# Patient Record
Sex: FEMALE | Race: WHITE | NOT HISPANIC OR LATINO | URBAN - METROPOLITAN AREA
[De-identification: names, ages, dates, MRNs, and addresses within clinical notes are randomized per-mention and may not be internally consistent; named-entity substitution may affect disease eponyms.]

---

## 2018-03-01 ENCOUNTER — OFFICE VISIT - HEALTHEAST (OUTPATIENT)
Dept: FAMILY MEDICINE | Facility: CLINIC | Age: 23
End: 2018-03-01

## 2018-03-01 DIAGNOSIS — F50.819 BINGE EATING DISORDER: ICD-10-CM

## 2018-03-01 DIAGNOSIS — Z30.41 SURVEILLANCE FOR BIRTH CONTROL, ORAL CONTRACEPTIVES: ICD-10-CM

## 2018-03-01 DIAGNOSIS — F33.0 MILD EPISODE OF RECURRENT MAJOR DEPRESSIVE DISORDER (H): ICD-10-CM

## 2018-03-01 ASSESSMENT — MIFFLIN-ST. JEOR: SCORE: 1771.03

## 2018-03-01 NOTE — ASSESSMENT & PLAN NOTE
Continue Prozac to 50 mg daily.  Will also start back into counseling as per eating disorder recommendations.

## 2018-03-01 NOTE — ASSESSMENT & PLAN NOTE
Overall doing well, but admits that she could use further counseling.  Referral placed.  Will restart back in 2 and continue the Prozac as previous at 50 mg daily.

## 2018-03-01 NOTE — ASSESSMENT & PLAN NOTE
Mainly using for control of periods.  Also discussed the fact that with her weight, it does reduce the effectiveness of the oral birth control for contraception and therefore if becoming sexuallyactive recommend using barrier contraception to help with contraception as well as prevention of STDs.

## 2018-03-21 ENCOUNTER — COMMUNICATION - HEALTHEAST (OUTPATIENT)
Dept: FAMILY MEDICINE | Facility: CLINIC | Age: 23
End: 2018-03-21

## 2018-03-29 ENCOUNTER — COMMUNICATION - HEALTHEAST (OUTPATIENT)
Dept: FAMILY MEDICINE | Facility: CLINIC | Age: 23
End: 2018-03-29

## 2018-04-13 ENCOUNTER — OFFICE VISIT - HEALTHEAST (OUTPATIENT)
Dept: FAMILY MEDICINE | Facility: CLINIC | Age: 23
End: 2018-04-13

## 2018-04-13 DIAGNOSIS — K12.0 ORAL APHTHOUS ULCER: ICD-10-CM

## 2018-04-13 DIAGNOSIS — J02.9 ACUTE PHARYNGITIS: ICD-10-CM

## 2018-04-13 LAB — DEPRECATED S PYO AG THROAT QL EIA: NORMAL

## 2018-04-14 LAB — GROUP A STREP BY PCR: NORMAL

## 2018-05-23 ENCOUNTER — RECORDS - HEALTHEAST (OUTPATIENT)
Dept: ADMINISTRATIVE | Facility: OTHER | Age: 23
End: 2018-05-23

## 2018-06-01 ENCOUNTER — OFFICE VISIT - HEALTHEAST (OUTPATIENT)
Dept: FAMILY MEDICINE | Facility: CLINIC | Age: 23
End: 2018-06-01

## 2018-06-01 DIAGNOSIS — T30.4 CHEMICAL BURN: ICD-10-CM

## 2018-06-11 ENCOUNTER — OFFICE VISIT - HEALTHEAST (OUTPATIENT)
Dept: FAMILY MEDICINE | Facility: CLINIC | Age: 23
End: 2018-06-11

## 2018-06-11 DIAGNOSIS — J01.10 ACUTE NON-RECURRENT FRONTAL SINUSITIS: ICD-10-CM

## 2018-07-23 ENCOUNTER — COMMUNICATION - HEALTHEAST (OUTPATIENT)
Dept: FAMILY MEDICINE | Facility: CLINIC | Age: 23
End: 2018-07-23

## 2018-08-14 ENCOUNTER — RECORDS - HEALTHEAST (OUTPATIENT)
Dept: ADMINISTRATIVE | Facility: OTHER | Age: 23
End: 2018-08-14

## 2018-08-15 ENCOUNTER — OFFICE VISIT - HEALTHEAST (OUTPATIENT)
Dept: FAMILY MEDICINE | Facility: CLINIC | Age: 23
End: 2018-08-15

## 2018-08-15 DIAGNOSIS — R39.89 POSSIBLE URINARY TRACT INFECTION: ICD-10-CM

## 2018-08-15 LAB
ALBUMIN UR-MCNC: NEGATIVE MG/DL
APPEARANCE UR: ABNORMAL
BACTERIA #/AREA URNS HPF: ABNORMAL HPF
BILIRUB UR QL STRIP: NEGATIVE
COLOR UR AUTO: YELLOW
GLUCOSE UR STRIP-MCNC: NEGATIVE MG/DL
HGB UR QL STRIP: ABNORMAL
KETONES UR STRIP-MCNC: NEGATIVE MG/DL
LEUKOCYTE ESTERASE UR QL STRIP: ABNORMAL
NITRATE UR QL: POSITIVE
PH UR STRIP: 5.5 [PH] (ref 5–8)
RBC #/AREA URNS AUTO: ABNORMAL HPF
SP GR UR STRIP: >=1.03 (ref 1–1.03)
SQUAMOUS #/AREA URNS AUTO: ABNORMAL LPF
UROBILINOGEN UR STRIP-ACNC: ABNORMAL
WBC #/AREA URNS AUTO: ABNORMAL HPF

## 2018-08-17 ENCOUNTER — COMMUNICATION - HEALTHEAST (OUTPATIENT)
Dept: FAMILY MEDICINE | Facility: CLINIC | Age: 23
End: 2018-08-17

## 2018-08-17 LAB — BACTERIA SPEC CULT: ABNORMAL

## 2018-08-23 ENCOUNTER — COMMUNICATION - HEALTHEAST (OUTPATIENT)
Dept: FAMILY MEDICINE | Facility: CLINIC | Age: 23
End: 2018-08-23

## 2018-08-27 ENCOUNTER — RECORDS - HEALTHEAST (OUTPATIENT)
Dept: ADMINISTRATIVE | Facility: OTHER | Age: 23
End: 2018-08-27

## 2018-09-17 ENCOUNTER — RECORDS - HEALTHEAST (OUTPATIENT)
Dept: GENERAL RADIOLOGY | Facility: CLINIC | Age: 23
End: 2018-09-17

## 2018-09-17 ENCOUNTER — OFFICE VISIT - HEALTHEAST (OUTPATIENT)
Dept: FAMILY MEDICINE | Facility: CLINIC | Age: 23
End: 2018-09-17

## 2018-09-17 ENCOUNTER — COMMUNICATION - HEALTHEAST (OUTPATIENT)
Dept: TELEHEALTH | Facility: CLINIC | Age: 23
End: 2018-09-17

## 2018-09-17 DIAGNOSIS — M79.644 PAIN OF FINGER OF RIGHT HAND: ICD-10-CM

## 2018-09-17 DIAGNOSIS — M79.644 PAIN IN RIGHT FINGER(S): ICD-10-CM

## 2018-09-17 ASSESSMENT — MIFFLIN-ST. JEOR: SCORE: 1799.16

## 2019-04-07 ENCOUNTER — COMMUNICATION - HEALTHEAST (OUTPATIENT)
Dept: FAMILY MEDICINE | Facility: CLINIC | Age: 24
End: 2019-04-07

## 2019-04-07 DIAGNOSIS — Z30.41 SURVEILLANCE FOR BIRTH CONTROL, ORAL CONTRACEPTIVES: ICD-10-CM

## 2019-07-24 ENCOUNTER — OFFICE VISIT - HEALTHEAST (OUTPATIENT)
Dept: FAMILY MEDICINE | Facility: CLINIC | Age: 24
End: 2019-07-24

## 2019-07-24 DIAGNOSIS — N39.0 URINARY TRACT INFECTION: ICD-10-CM

## 2019-07-24 DIAGNOSIS — H69.92 DYSFUNCTION OF LEFT EUSTACHIAN TUBE: ICD-10-CM

## 2019-07-24 DIAGNOSIS — Z13.1 SCREENING FOR DIABETES MELLITUS: ICD-10-CM

## 2019-07-24 LAB
ALBUMIN UR-MCNC: NEGATIVE MG/DL
ANION GAP SERPL CALCULATED.3IONS-SCNC: 8 MMOL/L (ref 5–18)
APPEARANCE UR: CLEAR
BACTERIA #/AREA URNS HPF: ABNORMAL HPF
BILIRUB UR QL STRIP: NEGATIVE
BUN SERPL-MCNC: 10 MG/DL (ref 8–22)
CALCIUM SERPL-MCNC: 9.3 MG/DL (ref 8.5–10.5)
CHLORIDE BLD-SCNC: 108 MMOL/L (ref 98–107)
CO2 SERPL-SCNC: 22 MMOL/L (ref 22–31)
COLOR UR AUTO: YELLOW
CREAT SERPL-MCNC: 0.6 MG/DL (ref 0.6–1.1)
GFR SERPL CREATININE-BSD FRML MDRD: >60 ML/MIN/1.73M2
GLUCOSE BLD-MCNC: 84 MG/DL (ref 70–125)
GLUCOSE UR STRIP-MCNC: NEGATIVE MG/DL
HBA1C MFR BLD: 5.2 % (ref 3.5–6)
HGB UR QL STRIP: ABNORMAL
KETONES UR STRIP-MCNC: NEGATIVE MG/DL
LEUKOCYTE ESTERASE UR QL STRIP: ABNORMAL
NITRATE UR QL: NEGATIVE
PH UR STRIP: 5 [PH] (ref 5–8)
POTASSIUM BLD-SCNC: 4.1 MMOL/L (ref 3.5–5)
RBC #/AREA URNS AUTO: ABNORMAL HPF
SODIUM SERPL-SCNC: 138 MMOL/L (ref 136–145)
SP GR UR STRIP: 1.01 (ref 1–1.03)
SQUAMOUS #/AREA URNS AUTO: ABNORMAL LPF
UROBILINOGEN UR STRIP-ACNC: ABNORMAL
WBC #/AREA URNS AUTO: ABNORMAL HPF

## 2019-07-25 LAB — BACTERIA SPEC CULT: NORMAL

## 2019-07-27 ENCOUNTER — COMMUNICATION - HEALTHEAST (OUTPATIENT)
Dept: SCHEDULING | Facility: CLINIC | Age: 24
End: 2019-07-27

## 2019-08-20 ENCOUNTER — OFFICE VISIT - HEALTHEAST (OUTPATIENT)
Dept: FAMILY MEDICINE | Facility: CLINIC | Age: 24
End: 2019-08-20

## 2019-08-20 DIAGNOSIS — F33.0 MILD EPISODE OF RECURRENT MAJOR DEPRESSIVE DISORDER (H): ICD-10-CM

## 2019-08-20 DIAGNOSIS — R05.9 COUGH: ICD-10-CM

## 2020-01-02 ENCOUNTER — OFFICE VISIT - HEALTHEAST (OUTPATIENT)
Dept: FAMILY MEDICINE | Facility: CLINIC | Age: 25
End: 2020-01-02

## 2020-01-02 DIAGNOSIS — B34.9 VIRAL SYNDROME: ICD-10-CM

## 2020-01-02 DIAGNOSIS — F50.819 BINGE EATING DISORDER: ICD-10-CM

## 2020-01-02 DIAGNOSIS — R25.1 SPELLS OF TREMBLING: ICD-10-CM

## 2020-01-02 DIAGNOSIS — F33.0 MILD EPISODE OF RECURRENT MAJOR DEPRESSIVE DISORDER (H): ICD-10-CM

## 2020-01-02 LAB
ALT SERPL W P-5'-P-CCNC: 20 U/L (ref 0–45)
AST SERPL W P-5'-P-CCNC: 12 U/L (ref 0–40)
BASOPHILS # BLD AUTO: 0.1 THOU/UL (ref 0–0.2)
BASOPHILS NFR BLD AUTO: 1 % (ref 0–2)
EOSINOPHIL # BLD AUTO: 0.4 THOU/UL (ref 0–0.4)
EOSINOPHIL NFR BLD AUTO: 3 % (ref 0–6)
ERYTHROCYTE [DISTWIDTH] IN BLOOD BY AUTOMATED COUNT: 11.2 % (ref 11–14.5)
HCT VFR BLD AUTO: 41.9 % (ref 35–47)
HGB BLD-MCNC: 13.6 G/DL (ref 12–16)
LYMPHOCYTES # BLD AUTO: 2.5 THOU/UL (ref 0.8–4.4)
LYMPHOCYTES NFR BLD AUTO: 19 % (ref 20–40)
MCH RBC QN AUTO: 27.4 PG (ref 27–34)
MCHC RBC AUTO-ENTMCNC: 32.5 G/DL (ref 32–36)
MCV RBC AUTO: 84 FL (ref 80–100)
MONOCYTES # BLD AUTO: 1 THOU/UL (ref 0–0.9)
MONOCYTES NFR BLD AUTO: 8 % (ref 2–10)
NEUTROPHILS # BLD AUTO: 9.2 THOU/UL (ref 2–7.7)
NEUTROPHILS NFR BLD AUTO: 69 % (ref 50–70)
PLATELET # BLD AUTO: 324 THOU/UL (ref 140–440)
PMV BLD AUTO: 7.9 FL (ref 7–10)
RBC # BLD AUTO: 4.97 MILL/UL (ref 3.8–5.4)
TSH SERPL DL<=0.005 MIU/L-ACNC: 0.56 UIU/ML (ref 0.3–5)
VALPROATE SERPL-MCNC: 17.5 UG/ML (ref 50–150)
WBC: 13.3 THOU/UL (ref 4–11)

## 2020-01-02 ASSESSMENT — ANXIETY QUESTIONNAIRES
5. BEING SO RESTLESS THAT IT IS HARD TO SIT STILL: NOT AT ALL
2. NOT BEING ABLE TO STOP OR CONTROL WORRYING: NOT AT ALL
IF YOU CHECKED OFF ANY PROBLEMS ON THIS QUESTIONNAIRE, HOW DIFFICULT HAVE THESE PROBLEMS MADE IT FOR YOU TO DO YOUR WORK, TAKE CARE OF THINGS AT HOME, OR GET ALONG WITH OTHER PEOPLE: SOMEWHAT DIFFICULT
4. TROUBLE RELAXING: NOT AT ALL
GAD7 TOTAL SCORE: 3
3. WORRYING TOO MUCH ABOUT DIFFERENT THINGS: NOT AT ALL
6. BECOMING EASILY ANNOYED OR IRRITABLE: NEARLY EVERY DAY
1. FEELING NERVOUS, ANXIOUS, OR ON EDGE: NOT AT ALL
7. FEELING AFRAID AS IF SOMETHING AWFUL MIGHT HAPPEN: NOT AT ALL

## 2020-01-02 ASSESSMENT — PATIENT HEALTH QUESTIONNAIRE - PHQ9: SUM OF ALL RESPONSES TO PHQ QUESTIONS 1-9: 7

## 2020-01-02 ASSESSMENT — MIFFLIN-ST. JEOR: SCORE: 1865.95

## 2020-01-03 ENCOUNTER — COMMUNICATION - HEALTHEAST (OUTPATIENT)
Dept: FAMILY MEDICINE | Facility: CLINIC | Age: 25
End: 2020-01-03

## 2020-01-03 DIAGNOSIS — F41.8 DEPRESSION WITH ANXIETY: ICD-10-CM

## 2020-01-03 LAB — TOPIRAMATE SERPL-MCNC: <1.5 UG/ML (ref 5–20)

## 2020-01-06 ENCOUNTER — COMMUNICATION - HEALTHEAST (OUTPATIENT)
Dept: FAMILY MEDICINE | Facility: CLINIC | Age: 25
End: 2020-01-06

## 2020-01-22 ENCOUNTER — OFFICE VISIT - HEALTHEAST (OUTPATIENT)
Dept: FAMILY MEDICINE | Facility: CLINIC | Age: 25
End: 2020-01-22

## 2020-01-22 DIAGNOSIS — R30.0 DYSURIA: ICD-10-CM

## 2020-01-22 DIAGNOSIS — H65.93 FLUID LEVEL BEHIND TYMPANIC MEMBRANE OF BOTH EARS: ICD-10-CM

## 2020-01-22 DIAGNOSIS — N39.0 RECURRENT UTI: ICD-10-CM

## 2020-01-22 LAB
ALBUMIN UR-MCNC: NEGATIVE MG/DL
ANION GAP SERPL CALCULATED.3IONS-SCNC: 9 MMOL/L (ref 5–18)
APPEARANCE UR: CLEAR
BACTERIA #/AREA URNS HPF: ABNORMAL HPF
BILIRUB UR QL STRIP: NEGATIVE
BUN SERPL-MCNC: 6 MG/DL (ref 8–22)
CALCIUM SERPL-MCNC: 8.7 MG/DL (ref 8.5–10.5)
CHLORIDE BLD-SCNC: 106 MMOL/L (ref 98–107)
CO2 SERPL-SCNC: 21 MMOL/L (ref 22–31)
COLOR UR AUTO: YELLOW
CREAT SERPL-MCNC: 0.59 MG/DL (ref 0.6–1.1)
GFR SERPL CREATININE-BSD FRML MDRD: >60 ML/MIN/1.73M2
GLUCOSE BLD-MCNC: 125 MG/DL (ref 70–125)
GLUCOSE UR STRIP-MCNC: NEGATIVE MG/DL
HGB UR QL STRIP: ABNORMAL
KETONES UR STRIP-MCNC: NEGATIVE MG/DL
LEUKOCYTE ESTERASE UR QL STRIP: NEGATIVE
NITRATE UR QL: NEGATIVE
PH UR STRIP: 6 [PH] (ref 5–8)
POTASSIUM BLD-SCNC: 3.8 MMOL/L (ref 3.5–5)
RBC #/AREA URNS AUTO: ABNORMAL HPF
SODIUM SERPL-SCNC: 136 MMOL/L (ref 136–145)
SP GR UR STRIP: >=1.03 (ref 1–1.03)
SQUAMOUS #/AREA URNS AUTO: ABNORMAL LPF
UROBILINOGEN UR STRIP-ACNC: ABNORMAL
WBC #/AREA URNS AUTO: ABNORMAL HPF

## 2020-01-23 ENCOUNTER — COMMUNICATION - HEALTHEAST (OUTPATIENT)
Dept: FAMILY MEDICINE | Facility: CLINIC | Age: 25
End: 2020-01-23

## 2020-01-27 ENCOUNTER — COMMUNICATION - HEALTHEAST (OUTPATIENT)
Dept: FAMILY MEDICINE | Facility: CLINIC | Age: 25
End: 2020-01-27

## 2020-01-28 ENCOUNTER — RECORDS - HEALTHEAST (OUTPATIENT)
Dept: ADMINISTRATIVE | Facility: OTHER | Age: 25
End: 2020-01-28

## 2020-01-30 ENCOUNTER — COMMUNICATION - HEALTHEAST (OUTPATIENT)
Dept: FAMILY MEDICINE | Facility: CLINIC | Age: 25
End: 2020-01-30

## 2020-01-30 DIAGNOSIS — F33.0 MILD EPISODE OF RECURRENT MAJOR DEPRESSIVE DISORDER (H): ICD-10-CM

## 2020-01-30 DIAGNOSIS — F32.1 MODERATE MAJOR DEPRESSION (H): ICD-10-CM

## 2020-01-31 ENCOUNTER — OFFICE VISIT - HEALTHEAST (OUTPATIENT)
Dept: FAMILY MEDICINE | Facility: CLINIC | Age: 25
End: 2020-01-31

## 2020-01-31 DIAGNOSIS — J10.1 INFLUENZA B: ICD-10-CM

## 2020-01-31 DIAGNOSIS — Z30.41 SURVEILLANCE FOR BIRTH CONTROL, ORAL CONTRACEPTIVES: ICD-10-CM

## 2020-01-31 LAB
FLUAV AG SPEC QL IA: ABNORMAL
FLUBV AG SPEC QL IA: ABNORMAL

## 2020-01-31 RX ORDER — ALBUTEROL SULFATE 0.83 MG/ML
2.5 SOLUTION RESPIRATORY (INHALATION) EVERY 4 HOURS PRN
Qty: 25 VIAL | Refills: 0 | Status: SHIPPED | OUTPATIENT
Start: 2020-01-31

## 2020-01-31 ASSESSMENT — MIFFLIN-ST. JEOR: SCORE: 1860.05

## 2020-01-31 NOTE — ASSESSMENT & PLAN NOTE
With current illness.  Reviewed with patient that she needs to use alternative form of birth control until she has completed the current pill pack and started on next pill pack.  Also reminded patient that the effectiveness of the oral contraceptive is lower given her BMI above 35 and as such recommended the use of barrier contraception in addition to increase effectiveness of contraception choices.

## 2020-02-19 ENCOUNTER — COMMUNICATION - HEALTHEAST (OUTPATIENT)
Dept: FAMILY MEDICINE | Facility: CLINIC | Age: 25
End: 2020-02-19

## 2020-02-19 DIAGNOSIS — Z30.09 GENERAL COUNSELING FOR PRESCRIPTION OF ORAL CONTRACEPTIVES: ICD-10-CM

## 2020-02-19 DIAGNOSIS — F33.0 MILD EPISODE OF RECURRENT MAJOR DEPRESSIVE DISORDER (H): ICD-10-CM

## 2020-02-19 DIAGNOSIS — F50.819 BINGE EATING DISORDER: ICD-10-CM

## 2020-02-20 ENCOUNTER — COMMUNICATION - HEALTHEAST (OUTPATIENT)
Dept: FAMILY MEDICINE | Facility: CLINIC | Age: 25
End: 2020-02-20

## 2020-02-25 ENCOUNTER — COMMUNICATION - HEALTHEAST (OUTPATIENT)
Dept: SURGERY | Facility: CLINIC | Age: 25
End: 2020-02-25

## 2020-03-13 ENCOUNTER — COMMUNICATION - HEALTHEAST (OUTPATIENT)
Dept: FAMILY MEDICINE | Facility: CLINIC | Age: 25
End: 2020-03-13

## 2020-03-13 DIAGNOSIS — F32.1 MODERATE MAJOR DEPRESSION (H): ICD-10-CM

## 2020-04-02 ENCOUNTER — COMMUNICATION - HEALTHEAST (OUTPATIENT)
Dept: FAMILY MEDICINE | Facility: CLINIC | Age: 25
End: 2020-04-02

## 2020-05-29 ENCOUNTER — COMMUNICATION - HEALTHEAST (OUTPATIENT)
Dept: FAMILY MEDICINE | Facility: CLINIC | Age: 25
End: 2020-05-29

## 2020-05-29 DIAGNOSIS — J45.21 MILD INTERMITTENT ASTHMA WITH EXACERBATION: ICD-10-CM

## 2020-06-01 ENCOUNTER — COMMUNICATION - HEALTHEAST (OUTPATIENT)
Dept: SCHEDULING | Facility: CLINIC | Age: 25
End: 2020-06-01

## 2020-06-01 ENCOUNTER — OFFICE VISIT - HEALTHEAST (OUTPATIENT)
Dept: FAMILY MEDICINE | Facility: CLINIC | Age: 25
End: 2020-06-01

## 2020-06-01 DIAGNOSIS — J10.1 INFLUENZA B: ICD-10-CM

## 2020-06-01 DIAGNOSIS — J45.41 MODERATE PERSISTENT ASTHMA WITH EXACERBATION: ICD-10-CM

## 2020-06-08 ENCOUNTER — COMMUNICATION - HEALTHEAST (OUTPATIENT)
Dept: SCHEDULING | Facility: CLINIC | Age: 25
End: 2020-06-08

## 2020-06-08 DIAGNOSIS — F33.0 MILD EPISODE OF RECURRENT MAJOR DEPRESSIVE DISORDER (H): ICD-10-CM

## 2020-06-09 ENCOUNTER — COMMUNICATION - HEALTHEAST (OUTPATIENT)
Dept: FAMILY MEDICINE | Facility: CLINIC | Age: 25
End: 2020-06-09

## 2020-06-09 DIAGNOSIS — F33.0 MILD EPISODE OF RECURRENT MAJOR DEPRESSIVE DISORDER (H): ICD-10-CM

## 2020-06-09 RX ORDER — HYDROXYZINE HYDROCHLORIDE 10 MG/1
10 TABLET, FILM COATED ORAL EVERY 6 HOURS PRN
Qty: 30 TABLET | Refills: 3 | Status: SHIPPED | OUTPATIENT
Start: 2020-06-09

## 2020-06-29 ENCOUNTER — COMMUNICATION - HEALTHEAST (OUTPATIENT)
Dept: SCHEDULING | Facility: CLINIC | Age: 25
End: 2020-06-29

## 2020-06-30 ENCOUNTER — OFFICE VISIT - HEALTHEAST (OUTPATIENT)
Dept: FAMILY MEDICINE | Facility: CLINIC | Age: 25
End: 2020-06-30

## 2020-06-30 DIAGNOSIS — J44.1: ICD-10-CM

## 2020-06-30 DIAGNOSIS — Z71.6 ENCOUNTER FOR SMOKING CESSATION COUNSELING: ICD-10-CM

## 2020-06-30 RX ORDER — IPRATROPIUM BROMIDE AND ALBUTEROL SULFATE 2.5; .5 MG/3ML; MG/3ML
3 SOLUTION RESPIRATORY (INHALATION) EVERY 6 HOURS PRN
Qty: 25 VIAL | Refills: 2 | Status: SHIPPED | OUTPATIENT
Start: 2020-06-30

## 2020-07-03 ENCOUNTER — COMMUNICATION - HEALTHEAST (OUTPATIENT)
Dept: FAMILY MEDICINE | Facility: CLINIC | Age: 25
End: 2020-07-03

## 2020-08-06 ENCOUNTER — COMMUNICATION - HEALTHEAST (OUTPATIENT)
Dept: FAMILY MEDICINE | Facility: CLINIC | Age: 25
End: 2020-08-06

## 2020-08-06 DIAGNOSIS — F32.1 MODERATE MAJOR DEPRESSION (H): ICD-10-CM

## 2020-08-20 ENCOUNTER — COMMUNICATION - HEALTHEAST (OUTPATIENT)
Dept: SCHEDULING | Facility: CLINIC | Age: 25
End: 2020-08-20

## 2020-08-20 ENCOUNTER — COMMUNICATION - HEALTHEAST (OUTPATIENT)
Dept: FAMILY MEDICINE | Facility: CLINIC | Age: 25
End: 2020-08-20

## 2020-08-20 DIAGNOSIS — J45.21 MILD INTERMITTENT ASTHMA WITH EXACERBATION: ICD-10-CM

## 2020-08-28 ENCOUNTER — COMMUNICATION - HEALTHEAST (OUTPATIENT)
Dept: FAMILY MEDICINE | Facility: CLINIC | Age: 25
End: 2020-08-28

## 2020-08-28 DIAGNOSIS — J45.909 MODERATE ASTHMA WITHOUT COMPLICATION, UNSPECIFIED WHETHER PERSISTENT: ICD-10-CM

## 2020-08-28 RX ORDER — ALBUTEROL SULFATE 90 UG/1
2 AEROSOL, METERED RESPIRATORY (INHALATION) EVERY 6 HOURS PRN
Qty: 1 EACH | Refills: 0 | Status: SHIPPED | OUTPATIENT
Start: 2020-08-28

## 2020-09-25 ENCOUNTER — COMMUNICATION - HEALTHEAST (OUTPATIENT)
Dept: FAMILY MEDICINE | Facility: CLINIC | Age: 25
End: 2020-09-25

## 2020-09-25 DIAGNOSIS — F33.0 MILD EPISODE OF RECURRENT MAJOR DEPRESSIVE DISORDER (H): ICD-10-CM

## 2020-09-25 DIAGNOSIS — F32.1 MODERATE MAJOR DEPRESSION (H): ICD-10-CM

## 2020-09-25 RX ORDER — TOPIRAMATE 50 MG/1
50 TABLET, FILM COATED ORAL DAILY
Qty: 90 TABLET | Refills: 0 | Status: SHIPPED | OUTPATIENT
Start: 2020-09-25

## 2020-09-28 ENCOUNTER — COMMUNICATION - HEALTHEAST (OUTPATIENT)
Dept: SCHEDULING | Facility: CLINIC | Age: 25
End: 2020-09-28

## 2020-09-28 DIAGNOSIS — F32.1 MODERATE MAJOR DEPRESSION (H): ICD-10-CM

## 2020-09-29 RX ORDER — GABAPENTIN 300 MG/1
300 CAPSULE ORAL DAILY
Qty: 90 CAPSULE | Refills: 0 | Status: SHIPPED | OUTPATIENT
Start: 2020-09-29

## 2021-01-13 ENCOUNTER — COMMUNICATION - HEALTHEAST (OUTPATIENT)
Dept: FAMILY MEDICINE | Facility: CLINIC | Age: 26
End: 2021-01-13

## 2021-01-13 ENCOUNTER — COMMUNICATION - HEALTHEAST (OUTPATIENT)
Dept: SCHEDULING | Facility: CLINIC | Age: 26
End: 2021-01-13

## 2021-01-13 DIAGNOSIS — F33.0 MILD EPISODE OF RECURRENT MAJOR DEPRESSIVE DISORDER (H): ICD-10-CM

## 2021-01-13 DIAGNOSIS — F32.1 MODERATE MAJOR DEPRESSION (H): ICD-10-CM

## 2021-01-14 ENCOUNTER — OFFICE VISIT - HEALTHEAST (OUTPATIENT)
Dept: INTERNAL MEDICINE | Facility: CLINIC | Age: 26
End: 2021-01-14

## 2021-01-14 DIAGNOSIS — R30.0 DYSURIA: ICD-10-CM

## 2021-05-27 ASSESSMENT — PATIENT HEALTH QUESTIONNAIRE - PHQ9: SUM OF ALL RESPONSES TO PHQ QUESTIONS 1-9: 7

## 2021-05-27 NOTE — TELEPHONE ENCOUNTER
RN cannot approve Refill Request    RN can NOT refill this medication historical medication requested.         Emma Garcia, [facility] Triage/Med Refill 4/8/2019    Requested Prescriptions   Pending Prescriptions Disp Refills     TRI FEMYNOR 0.18/0.215/0.25 mg-35 mcg (28) Tab tablet [Pharmacy Med Name: TRI FEMYNOR 28 TABLET] 84 tablet 3     Sig: TAKE 1 TABLET BY MOUTH DAILY.       Oral Contraceptives Protocol Passed - 4/7/2019  8:30 AM        Passed - Visit with PCP or prescribing provider visit in last 12 months      Last office visit with prescriber/PCP: 3/1/2018 Skip Moody DO OR same dept: 9/17/2018 Pauline Bustos MD OR same specialty: 9/17/2018 Pauline Bustos MD  Last physical: Visit date not found Last MTM visit: Visit date not found   Next visit within 3 mo: Visit date not found  Next physical within 3 mo: Visit date not found  Prescriber OR PCP: Skip Moody DO  Last diagnosis associated with med order: 1. Surveillance for birth control, oral contraceptives  - TRI FEMYNOR 0.18/0.215/0.25 mg-35 mcg (28) Tab tablet [Pharmacy Med Name: TRI FEMYNOR 28 TABLET]; TAKE 1 TABLET BY MOUTH DAILY.  Dispense: 84 tablet; Refill: 3    If protocol passes may refill for 12 months if within 3 months of last provider visit (or a total of 15 months).

## 2021-05-28 ASSESSMENT — ANXIETY QUESTIONNAIRES: GAD7 TOTAL SCORE: 3

## 2021-05-28 ASSESSMENT — ASTHMA QUESTIONNAIRES: ACT_TOTALSCORE: 18

## 2021-05-30 NOTE — TELEPHONE ENCOUNTER
Called and informed pt that all results are normal - pt states UTI Sx have resolved at this time.

## 2021-05-30 NOTE — TELEPHONE ENCOUNTER
RN Triage:     Patient is calling in stating that she had labs drawn on 7/24/19. She is asking to be called with the results.   Please advise on the results.     Ember Bland RN, BSN [facility] Triage Nurse    Reason for Disposition    Caller requesting lab results    Protocols used: PCP CALL - NO TRIAGE-A-

## 2021-05-30 NOTE — PROGRESS NOTES
Assessment/Plan:        1. Urinary tract infection  - Urinalysis-UC if Indicated  - Culture, Urine  With findings were discussed with the patient positive for UTI  Plan:  - sulfamethoxazole-trimethoprim (BACTRIM DS) 800-160 mg per tablet; Take 1 tablet by mouth 2 (two) times a day for 7 days.  Dispense: 14 tablet; Refill: 0    2. Dysfunction of left eustachian tube  Exam findings were discussed and reassurance given  May try the over-the-counter Flonase  Close follow up with any worsening or no significant improvement as anticipated.     3. Screening for diabetes mellitus    - Basic Metabolic Panel  - Glycosylated Hemoglobin A1c   We will follow-up to the results of the study and manage accordingly.          Subjective:    Patient ID:   Roseline Tyler is a 23 y.o. female presenting with:    1.  Urinary dysuria, and frequency over the past couple of days  History of recurrent UTIs  Denies any fever chills or back pain    2.  Having pain in the left ear since this morning  History of PE tubes  No additional concerns    3.  Going to be screened for diabetes  History of prediabetes      Review of Systems  Allergy: reviewed  General : negative  A complete 5 point review of systems was obtained and is negative other than what is stated in the HPI.       The following patient's history were reviewed and updated as appropriate:   She  has a past medical history of Anxiety, Asthma, and Depression..      Outpatient Encounter Medications as of 7/24/2019   Medication Sig Dispense Refill     FLUoxetine (PROZAC) 20 MG capsule        gabapentin (NEURONTIN) 300 MG capsule Take 300 capsules by mouth daily.       hydrOXYzine HCl (ATARAX) 10 MG tablet        norgestimate-ethinyl estradiol (TRI-ESTARYLLA) 0.18/0.215/0.25 mg-35 mcg (28) Tab tablet Take by mouth.       topiramate (TOPAMAX) 50 MG tablet Take 50 mg by mouth daily.       TRI FEMYNOR 0.18/0.215/0.25 mg-35 mcg (28) Tab tablet TAKE 1 TABLET BY MOUTH DAILY. 84 tablet 3      albuterol (PROAIR HFA;PROVENTIL HFA;VENTOLIN HFA) 90 mcg/actuation inhaler Inhale 2 puffs as needed.       No facility-administered encounter medications on file as of 7/24/2019.          Objective:   /62 (Patient Site: Right Arm, Patient Position: Sitting, Cuff Size: Adult Large)   Pulse 85   Wt (!) 255 lb 12.8 oz (116 kg)   SpO2 98%   BMI 48.33 kg/m        Physical Exam    General Appearance:    Alert, cooperative, no distress   Eyes:    PERRL, conjunctiva/corneas clear, EOM's intact, both eyes   Ears:   Middle ear effusion bilaterally, TM scars from past tympanostomy tubes   Throat:   mucous membranes moist, pharynx normal without lesions   Neck:   Supple, symmetrical, trachea midline, no adenopathy;     thyroid:  no enlargement/tenderness/nodules;    Lungs:     clear to auscultation, no wheezes, rales or rhonchi, symmetric air entry     Heart:    Regular rate and rhythm, S1 and S2 normal, no murmur, rub   or gallop   Skin:   Skin color, texture, turgor normal, no rashes or lesions

## 2021-05-30 NOTE — TELEPHONE ENCOUNTER
Result Notes for Glycosylated Hemoglobin A1c     Notes recorded by Khadar Hutson MD on 7/29/2019 at 10:27 AM CDT  Normal

## 2021-05-31 ENCOUNTER — RECORDS - HEALTHEAST (OUTPATIENT)
Dept: ADMINISTRATIVE | Facility: CLINIC | Age: 26
End: 2021-05-31

## 2021-05-31 NOTE — PROGRESS NOTES
Assessment:   1. Cough  Secondary to bronchitis. Breathing mildly improved with albuterol neb. Recommend treatment with oral corticosteroid and antihistamines. Patient verbalized understanding.   - XR Chest 2 Views: Negative chest.   - albuterol nebulizer solution 3 mL (PROVENTIL)     Plan:   Explained lack of efficacy of antibiotics in viral disease.  Antitussives per medication orders.  Avoid exposure to tobacco smoke and fumes.  B-agonist inhaler.  Call if shortness of breath worsens, blood in sputum, change in character of cough, development of fever or chills, inability to maintain nutrition and hydration. Avoid exposure to tobacco smoke and fumes.  Chest x-ray.  Trial of antihistamines.     Subjective:   Roseline Tyler is a 23 y.o. female here for evaluation of a cough. Onset of symptoms was 3 days ago. Symptoms have been gradually worsening since that time. The cough is harsh and productive and is aggravated by nothing. Associated symptoms include: chest pain, fever, postnasal drip, shortness of breath and sore thraot. Patient does not have a history of asthma. Patient does not have a history of environmental allergens. Patient has not traveled recently. Patient does not have a history of smoking. Patient has not had a previous chest x-ray. Patient has not had a PPD done.    The following portions of the patient's history were reviewed and updated as appropriate: allergies, current medications, past family history, past medical history, past social history, past surgical history and problem list.    Review of Systems  A 12 point comprehensive review of systems was negative except as noted.      Objective:   Oxygen saturation 97% on room air  /62   Pulse 89   Temp 98.2  F (36.8  C) (Oral)   Wt (!) 254 lb 5 oz (115.4 kg)   SpO2 97%   BMI 48.05 kg/m    General appearance: alert, appears stated age and cooperative  Head: Normocephalic, without obvious abnormality, atraumatic  Eyes:  conjunctivae/corneas clear. PERRL, EOM's intact. Fundi benign.  Ears: normal TM's and external ear canals both ears  Nose: yellow discharge, moderate congestion, turbinates normal  Throat: lips, mucosa, and tongue normal; teeth and gums normal  Neck: no adenopathy, no carotid bruit, no JVD, supple, symmetrical, trachea midline and thyroid not enlarged, symmetric, no tenderness/mass/nodules  Lungs: wheezes LLL  Heart: regular rate and rhythm, S1, S2 normal, no murmur, click, rub or gallop  Pulses: 2+ and symmetric  Skin: Skin color, texture, turgor normal. No rashes or lesions  Lymph nodes: Cervical, supraclavicular, and axillary nodes normal.  Neurologic: Grossly normal

## 2021-06-01 VITALS — BODY MASS INDEX: 45.73 KG/M2 | WEIGHT: 242 LBS

## 2021-06-01 VITALS — BODY MASS INDEX: 45.54 KG/M2 | WEIGHT: 241 LBS

## 2021-06-01 VITALS — BODY MASS INDEX: 46.12 KG/M2 | WEIGHT: 244.1 LBS

## 2021-06-01 VITALS — WEIGHT: 237.8 LBS | HEIGHT: 61 IN | BODY MASS INDEX: 44.89 KG/M2

## 2021-06-01 VITALS — BODY MASS INDEX: 46.33 KG/M2 | WEIGHT: 245.2 LBS

## 2021-06-02 VITALS — BODY MASS INDEX: 46.07 KG/M2 | HEIGHT: 61 IN | WEIGHT: 244 LBS

## 2021-06-03 VITALS — BODY MASS INDEX: 48.05 KG/M2 | WEIGHT: 254.31 LBS

## 2021-06-03 VITALS — BODY MASS INDEX: 48.33 KG/M2 | WEIGHT: 255.8 LBS

## 2021-06-04 VITALS
SYSTOLIC BLOOD PRESSURE: 108 MMHG | HEART RATE: 97 BPM | DIASTOLIC BLOOD PRESSURE: 74 MMHG | OXYGEN SATURATION: 100 % | BODY MASS INDEX: 50.16 KG/M2 | WEIGHT: 263.3 LBS

## 2021-06-04 VITALS
OXYGEN SATURATION: 98 % | SYSTOLIC BLOOD PRESSURE: 117 MMHG | TEMPERATURE: 98.3 F | BODY MASS INDEX: 49.01 KG/M2 | RESPIRATION RATE: 16 BRPM | DIASTOLIC BLOOD PRESSURE: 77 MMHG | HEART RATE: 107 BPM | WEIGHT: 259.6 LBS | HEIGHT: 61 IN

## 2021-06-04 VITALS
OXYGEN SATURATION: 98 % | RESPIRATION RATE: 16 BRPM | TEMPERATURE: 98.9 F | DIASTOLIC BLOOD PRESSURE: 74 MMHG | HEART RATE: 94 BPM | BODY MASS INDEX: 48.77 KG/M2 | WEIGHT: 258.3 LBS | HEIGHT: 61 IN | SYSTOLIC BLOOD PRESSURE: 118 MMHG

## 2021-06-04 NOTE — TELEPHONE ENCOUNTER
----- Message from Yolande Madrigal PA-C sent at 1/3/2020  6:48 AM CST -----  Labs are normal, please call results to the patient or send a letter if not reachable by phone.

## 2021-06-04 NOTE — PROGRESS NOTES
HPI:  Roseline Tyler is a 24 y.o. female who is seen for   Chief Complaint   Patient presents with     Establish Care     coming here from [facility], depression and anxiety, binge eating disorder, would like to be seen here and get her medications here      Thyroid Problem     has been told that her thyroid is enlarged, would like follow-up on this    Roseline Tyler is seen to follow up on treatment by [facility].  She has had ongoing issues with getting medication refills, follow-up appointments.  At times she has had to go off her medication, this is caused her great distress of mood.  She is currently not out of any of her psychiatric medication.  She would like to start a referral to a new psychiatric care so that she does not have lapses in her medication.  She is currently on Paxil 60 mg daily, hydroxyzine 10 mg up to 3 times daily, Depakote 250 mg and Topamax 50 mg at night.  She also takes gabapentin 300 mg at night.  Depression and binge eating.  She is interested in starting some kind of a diet program.  She would like to have a referral.  Has a history of tremors which have not been diagnosed as seizure, she has not had an EEG.  She has had a history of mood disorder since her teen years.  She denies current suicidal or homicidal ideations.  ROS: Patient denies fever, chills, sweats, fainting, fatigue, weight change, dizziness, sleep problems, chest pain, palpitations, shortness of breath, wheezing, cough,  sore throat, changes in hearing, ear pain,tinnitus,  disphagia, sore throat, globus, changes in vision, eye pain eye redness, acid reflux, nausea, vomiting, diarrhea, constipation, black or bloody stools,  Dysuria, frequency, urinary incontinence, nocturia, hematuria, back pain,joint pain, bone pain, muscle cramps,edema, weakness, numbness, tingling of extremities, rash, itching, skin changes, swollen lymph nodes, thirst, increased urination, breast lumps, breast pain, nipple discharge,  memory difficulties, anxiety, mood swings, (female)vaginal discharge, dyspareunia, menorrhagia, pelvic pain, sexual dysfunction,   (male) testicular lumps, erectile dysfunction.    Lab Results   Component Value Date    HGBA1C 5.2 07/24/2019    HGBA1C 5.3 06/18/2015     Lab Results   Component Value Date    LDLCALC 113 06/18/2015    CREATININE 0.60 07/24/2019     Patient Active Problem List   Diagnosis     Binge eating disorder     Mild episode of recurrent major depressive disorder (H)     Surveillance for birth control, oral contraceptives     Family History   Problem Relation Age of Onset     Mental illness Mother      No Medical Problems Father      No Medical Problems Sister      Cancer Brother      Heart disease Maternal Grandmother      Cancer Maternal Grandfather 72        lung     Cancer Paternal Grandmother 64     Cancer Paternal Grandfather      Social History     Socioeconomic History     Marital status: Single     Spouse name: None     Number of children: 0     Years of education: None     Highest education level: None   Occupational History     Occupation:      Employer: [employer]   Social Needs     Financial resource strain: None     Food insecurity:     Worry: None     Inability: None     Transportation needs:     Medical: None     Non-medical: None   Tobacco Use     Smoking status: Never Smoker     Smokeless tobacco: Never Used   Substance and Sexual Activity     Alcohol use: Not Currently     Drug use: None     Sexual activity: Yes     Partners: Male     Birth control/protection: Pill   Lifestyle     Physical activity:     Days per week: None     Minutes per session: None     Stress: None   Relationships     Social connections:     Talks on phone: None     Gets together: None     Attends Orthodoxy service: None     Active member of club or organization: None     Attends meetings of clubs or organizations: None     Relationship status: None     Intimate  partner violence:     Fear of current or ex partner: None     Emotionally abused: None     Physically abused: None     Forced sexual activity: None   Other Topics Concern     None   Social History Narrative     None     Past Surgical History:   Procedure Laterality Date     TONSILLECTOMY       TONSILLECTOMY AND ADENOIDECTOMY       Current Outpatient Medications on File Prior to Visit   Medication Sig Dispense Refill     divalproex (DEPAKOTE ER) 250 MG 24 hour tablet        gabapentin (NEURONTIN) 300 MG capsule Take 300 capsules by mouth daily.       hydrOXYzine HCl (ATARAX) 10 MG tablet Take 1 tablet (10 mg total) by mouth every 6 (six) hours as needed for itching. 30 tablet 0     norgestimate-ethinyl estradiol (TRI-ESTARYLLA) 0.18/0.215/0.25 mg-35 mcg (28) Tab tablet Take by mouth.       topiramate (TOPAMAX) 50 MG tablet Take 50 mg by mouth daily.       guaiFENesin (ROBITUSSIN) 100 mg/5 mL syrup Take 10 mL (200 mg total) by mouth 3 (three) times a day as needed for cough.  0     loratadine (CLARITIN) 10 mg tablet Take 1 tablet (10 mg total) by mouth daily. 30 tablet 2     No current facility-administered medications on file prior to visit.      Allergies   Allergen Reactions     Adhesive Tape-Silicones Rash     OB History    No obstetric history on file.       I have reviewed the patient's medical history in detail and updated the computerized patient record.  OBJECTIVE:  Wt Readings from Last 3 Encounters:   01/02/20 (!) 259 lb 9.6 oz (117.8 kg)   08/20/19 (!) 254 lb 5 oz (115.4 kg)   07/24/19 (!) 255 lb 12.8 oz (116 kg)     Temp Readings from Last 3 Encounters:   01/02/20 98.3  F (36.8  C) (Oral)   08/20/19 98.2  F (36.8  C) (Oral)   06/11/18 98.3  F (36.8  C) (Oral)     BP Readings from Last 3 Encounters:   01/02/20 117/77   08/20/19 130/62   07/24/19 110/62     Pulse Readings from Last 3 Encounters:   01/02/20 (!) 107   08/20/19 89   07/24/19 85     Body mass index is 49.46 kg/m .     Alert, cooperative,  well-hydrated. Appears well.  Eyes: Pupils equal, round, reactive to light.  HEENT: Sclera white, nares patent, MMM, TM's pearly bilaterally  Neck: supple, without lymphadenopathy, Thyroid freely movable and without hypotrophy or nodularity.   Lungs: Clear to auscultation. No retractions, no increased work of respiration, equal chest rise.   Heart: Regular rate and rhythm, no murmurs, clicks,   Gallops.  Abdomen: Soft, bowel sounds in 4 quadrants with no tenderness to palpation, no organomegaly or masses, no aortic or renal bruits.  Extremities: no tenderness to palpation of gastrocnemius, bilaterally.  Skin: no increased warmth, edema, or erythema of lower legs bilaterally.  Back: No cervical, thoracic or lumbar tenderness to spinous processes or musculature.  Neuro::pupils equal and reactive to light bilaterally, CN II - XII grossly intact. No focal motor/sensory deficits.  No upper body tremor.  No jaw claudication.  Alert, cooperative, well-hydrated. Appears well.  Eyes: Pupils equal, round, reactive to light.  HEENT: Sclera white, nares patent, MMM, no jaw claudication, range of motion full.  Lungs: Clear to auscultation. No retractions, no increased work of respiration, equal chest rise.   Heart: Regular rate and rhythm, no murmurs, clicks,   Gallops.  Extremities: Upper body tremor.  Mood: Good eye contact, smiles easily, full affect, no pressured speech, no psychomotor agitation, no suicidal or homicidal ideations.  Little interest or pleasure in doing things: Not at all  Feeling down, depressed, or hopeless: Several days  Trouble falling or staying asleep, or sleeping too much: More than half the days  Feeling tired or having little energy: Several days  Poor appetite or overeating: Nearly every day  Feeling bad about yourself - or that you are a failure or have let yourself or your family down: Not at all  Trouble concentrating on things, such as reading the newspaper or watching television: Not at  all  Moving or speaking so slowly that other people could have noticed. Or the opposite - being so fidgety or restless that you have been moving around a lot more than usual: Not at all  Thoughts that you would be better off dead, or of hurting yourself in some way: Not at all  PHQ-9 Total Score: 7  If you checked off any problems, how difficult have these problems made it for you to do your work, take care of things at home, or get along with other people?: Somewhat difficult    Labs:  No visits with results within 3 Month(s) from this visit.   Latest known visit with results is:   Office Visit on 07/24/2019   Component Date Value     Color, UA 07/24/2019 Yellow      Clarity, UA 07/24/2019 Clear      Glucose, UA 07/24/2019 Negative      Bilirubin, UA 07/24/2019 Negative      Ketones, UA 07/24/2019 Negative      Specific Gravity, UA 07/24/2019 1.010      Blood, UA 07/24/2019 Moderate*     pH, UA 07/24/2019 5.0      Protein, UA 07/24/2019 Negative      Urobilinogen, UA 07/24/2019 0.2 E.U./dL      Nitrite, UA 07/24/2019 Negative      Leukocytes, UA 07/24/2019 Small*     Bacteria, UA 07/24/2019 Few*     RBC, UA 07/24/2019 10-25*     WBC, UA 07/24/2019 5-10*     Squam Epithel, UA 07/24/2019 5-10*     Culture 07/24/2019 Mixture of urogenital organisms      Sodium 07/24/2019 138      Potassium 07/24/2019 4.1      Chloride 07/24/2019 108*     CO2 07/24/2019 22      Anion Gap, Calculation 07/24/2019 8      Glucose 07/24/2019 84      Calcium 07/24/2019 9.3      BUN 07/24/2019 10      Creatinine 07/24/2019 0.60      GFR MDRD Af Amer 07/24/2019 >60      GFR MDRD Non Af Amer 07/24/2019 >60      Hemoglobin A1c 07/24/2019 5.2      ASSESMENT/PLAN:  1. Viral syndrome  fluticasone propionate (FLONASE) 50 mcg/actuation nasal spray   2. Mild episode of recurrent major depressive disorder (H)  FLUoxetine (PROZAC) 20 MG capsule    HM1(CBC and Differential)    Topiramate [Topamax ]    Valproic Acid (Depakene )    ALT (SGPT)    AST (SGOT)     Thyroid [place]    Ambulatory referral to Bariatric Care: Surgical and Non-Surgical    HM1(CBC and Differential)    Topiramate [Topamax ]    Valproic Acid (Depakene )    ALT (SGPT)    AST (SGOT)    Thyroid [place]    HM1 (CBC with Diff)    PHQ9 Depression Screen   3. Spells of trembling  EEG Awake and Asleep (less than 41mins)   4. Binge eating disorder  Ambulatory referral to Bariatric Care: Surgical and Non-Surgical    PHQ9 Depression Screen   1.  Currently having some viral symptoms, advised to use Flonase and not to use any decongestants.  Increase fluids.  2.  We will recheck her valproic acid, topiramate, and CBC with ALT and AST today in case I will need to bridge her medications while she is waiting for her psychiatric referral.  Referral done for psychiatry at [facility].  3.  We will get EEG and referral to neurology as needed.  She is on 3 antiseizure medications for her mood disorder and migraine headaches.  States that she still is having episodes of tremor that last a few seconds at a time.  Could be related to anxiety.  4.  Referral given for bariatric 24-week diet program.  Follow-up in 2 weeks.  Yolande Madrigal, MS, PA-C 01/06/20

## 2021-06-04 NOTE — TELEPHONE ENCOUNTER
1/2/2020 Yolande Madrigal PA-C   Valproic Acid 50.0 - 150.0 ug/mL 17.5Low     Comment: Recommended therapeutic trough conc range when used   for manic episodes associated with bipolar disorder:    ug/ml.

## 2021-06-05 NOTE — TELEPHONE ENCOUNTER
Office Visit on 01/22/2020   Component Date Value Ref Range Status     Color, UA 01/22/2020 Yellow  Colorless, Yellow, Straw, Light Yellow Final     Clarity, UA 01/22/2020 Clear  Clear Final     Glucose, UA 01/22/2020 Negative  Negative Final     Bilirubin, UA 01/22/2020 Negative  Negative Final     Ketones, UA 01/22/2020 Negative  Negative Final     Specific Gravity, UA 01/22/2020 >=1.030  1.005 - 1.030 Final     Blood, UA 01/22/2020 Trace* Negative Final     pH, UA 01/22/2020 6.0  5.0 - 8.0 Final     Protein, UA 01/22/2020 Negative  Negative mg/dL Final     Urobilinogen, UA 01/22/2020 0.2 E.U./dL  0.2 E.U./dL, 1.0 E.U./dL Final     Nitrite, UA 01/22/2020 Negative  Negative Final     Leukocytes, UA 01/22/2020 Negative  Negative Final     Bacteria, UA 01/22/2020 Few* None Seen hpf Final     RBC, UA 01/22/2020 0-2  None Seen, 0-2 hpf Final     WBC, UA 01/22/2020 0-5  None Seen, 0-5 hpf Final     Squam Epithel, UA 01/22/2020 5-10* None Seen, 0-5 lpf Final     Sodium 01/22/2020 136  136 - 145 mmol/L Final     Potassium 01/22/2020 3.8  3.5 - 5.0 mmol/L Final     Chloride 01/22/2020 106  98 - 107 mmol/L Final     CO2 01/22/2020 21* 22 - 31 mmol/L Final     Anion Gap, Calculation 01/22/2020 9  5 - 18 mmol/L Final     Glucose 01/22/2020 125  70 - 125 mg/dL Final     Calcium 01/22/2020 8.7  8.5 - 10.5 mg/dL Final     BUN 01/22/2020 6* 8 - 22 mg/dL Final     Creatinine 01/22/2020 0.59* 0.60 - 1.10 mg/dL Final     GFR MDRD Af Amer 01/22/2020 >60  >60 mL/min/1.73m2 Final     GFR MDRD Non Af Amer 01/22/2020 >60  >60 mL/min/1.73m2 Final     1/22/2020 Khadar Hutson MD

## 2021-06-05 NOTE — TELEPHONE ENCOUNTER
Medication Question or Clarification  Who is calling: Patient  What medication are you calling about (include dose and sig)?:   topiramate (TOPAMAX) 50 MG tablet  50 mg, Oral, DAILY         Summary: Take 50 mg by mouth daily.   Dose, Frequency: 50 mg, DAILY  Start: 7/30/        gabapentin (NEURONTIN) 300 MG capsule  300 capsule, Oral, DAILY         Summary: Take 300 capsules by mouth daily.          Who prescribed the medication?: historic  What is your question/concern?: Patient is almost our of medication.  Please advise if Yolande will fill these.  She uses a new pharmacy so did have it in her pharmacy chart.    Requested Pharmacy: [facility]  Okay to leave a detailed message?: Yes

## 2021-06-05 NOTE — TELEPHONE ENCOUNTER
Refill Approved    Rx renewed per Medication Renewal Policy. Medication was last renewed on 8/20/19 .    Charo Perez, [facility] Triage/Med Refill 1/31/2020     Requested Prescriptions   Pending Prescriptions Disp Refills     hydrOXYzine HCl (ATARAX) 10 MG tablet 30 tablet 0     Sig: Take 1 tablet (10 mg total) by mouth every 6 (six) hours as needed for itching.       Antihistamine Refill Protocol Passed - 1/30/2020 11:49 AM        Passed - Patient has had office visit/physical in last year     Last office visit with prescriber/PCP: 8/20/2019 Melissa Jorgensen FNP OR same dept: 8/20/2019 Melissa Jorgensen FNP OR same specialty: 1/22/2020 Khadar Hutson MD  Last physical: Visit date not found Last MTM visit: Visit date not found   Next visit within 3 mo: Visit date not found  Next physical within 3 mo: Visit date not found  Prescriber OR PCP: HARIS Estes  Last diagnosis associated with med order: 1. Mild episode of recurrent major depressive disorder (H)  - hydrOXYzine HCl (ATARAX) 10 MG tablet; Take 1 tablet (10 mg total) by mouth every 6 (six) hours as needed for itching.  Dispense: 30 tablet; Refill: 0    If protocol passes may refill for 12 months if within 3 months of last provider visit (or a total of 15 months).

## 2021-06-05 NOTE — TELEPHONE ENCOUNTER
Last Office Visit  1/22/2020 Khadar Hutson MD-UTI    1/2/2020- Establish care with Yolande MARTINES Hersom is seen to follow up on treatment by [facility].  She has had ongoing issues with getting medication refills, follow-up appointments.  At times she has had to go off her medication, this is caused her great distress of mood.  She is currently not out of any of her psychiatric medication.  She would like to start a referral to a new psychiatric care so that she does not have lapses in her medication.  She is currently on Paxil 60 mg daily, hydroxyzine 10 mg up to 3 times daily, Depakote 250 mg and Topamax 50 mg at night.  She also takes gabapentin 300 mg at night.    Last Filled:7/30/2018  Next OV:  2/6/2020 Yolande Madrigal PA-C        Medication teed up for provider signature-adjust as needed

## 2021-06-05 NOTE — PROGRESS NOTES
Assessment/Plan:        1. Recurrent UTI  - Urinalysis-UC if Indicated,   - Basic Metabolic Panel  - Ambulatory referral to Urology    2. Dysuria  - Urinalysis-UC if Indicated  UA findings were discussed with the patient with trace of blood and a few bacteria negative for nitrites and LE, UA pending  Due to presenting symptoms we will go ahead and start the antibiotic  - sulfamethoxazole-trimethoprim (BACTRIM DS) 800-160 mg per tablet; Take 1 tablet by mouth 2 (two) times a day for 7 days.  Dispense: 14 tablet; Refill: 0    Close follow up if no significant change or improvement as anticipated.       3. Fluid level behind tympanic membrane of both ears  Exam findings and pathophysiology reviewed  Plan:  - fluticasone propionate (FLONASE) 50 mcg/actuation nasal spray; 2 sprays into each nostril daily.  Dispense: 10 g; Refill: 3          At the conclusion of the encounter the plan of care, disposition and all questions were answered and reviewed, and the patient acknowledged understanding and was involved in the decision making regarding the overall care plan.         Subjective:    Patient ID:   Roseline Tylre is a 24 y.o. female comes in with the following concerns:    Having pain and pressure in her right ear radiating down behind her jaw for the past few days.  She has no sore throat sinus pressure pain or fever.      Having dysuria and frequency with for the past 4-5 days, resembling past urine tract infections.  She has a recurrent UTI questioning the reason.  She is willing to follow-up with urology for further evaluation.         Review of Systems  Allergy: reviewed  General : negative  A complete 5 point review of systems was obtained and is negative other than what is stated in the HPI.       The following patient's history were reviewed and updated as appropriate:   She  has a past medical history of Anxiety, Asthma, and Depression.  She  has a past surgical history that includes Tonsillectomy and  Tonsillectomy and adenoidectomy..      Outpatient Encounter Medications as of 1/22/2020   Medication Sig Dispense Refill     divalproex (DEPAKOTE ER) 250 MG 24 hour tablet        FLUoxetine (PROZAC) 20 MG capsule Take 3 capsules (60 mg total) by mouth daily.       fluticasone propionate (FLONASE) 50 mcg/actuation nasal spray 2 sprays into each nostril daily. 10 g 11     gabapentin (NEURONTIN) 300 MG capsule Take 300 capsules by mouth daily.       hydrOXYzine HCl (ATARAX) 10 MG tablet Take 1 tablet (10 mg total) by mouth every 6 (six) hours as needed for itching. 30 tablet 0     norgestimate-ethinyl estradiol (TRI-ESTARYLLA) 0.18/0.215/0.25 mg-35 mcg (28) Tab tablet Take by mouth.       topiramate (TOPAMAX) 50 MG tablet Take 50 mg by mouth daily.       guaiFENesin (ROBITUSSIN) 100 mg/5 mL syrup Take 10 mL (200 mg total) by mouth 3 (three) times a day as needed for cough.  0     loratadine (CLARITIN) 10 mg tablet Take 1 tablet (10 mg total) by mouth daily. 30 tablet 2     No facility-administered encounter medications on file as of 1/22/2020.          Objective:   /74 (Patient Site: Right Arm, Patient Position: Sitting, Cuff Size: Adult Large)   Pulse 97   Wt (!) 263 lb 4.8 oz (119.4 kg)   SpO2 100%   BMI 50.16 kg/m        Physical Exam    General Appearance:    Alert, cooperative, no distress   Head:    Normocephalic, Sinus: Nontender   Eyes:    PERRL, conjunctiva/corneas clear, EOM's intact, both eyes   Ears:   Middle ear effusion bilaterally, normal canals   Throat:   mucous membranes moist, pharynx normal without lesions   Neck:   Supple, symmetrical, trachea midline, no adenopathy;     thyroid:  no enlargement/tenderness/nodules;    Abdomen:   Palpable tenderness to the suprapubic area, no rebound or guarding   Skin:   Skin color, texture, turgor normal, no rashes or lesions

## 2021-06-05 NOTE — TELEPHONE ENCOUNTER
Refill Approved    Rx renewed per Medication Renewal Policy. Medication was last renewed on 1/2/2020.    Charo Perez, [facility] Connection Triage/Med Refill 1/31/2020     Requested Prescriptions   Pending Prescriptions Disp Refills     FLUoxetine (PROZAC) 20 MG capsule  0     Sig: Take 3 capsules (60 mg total) by mouth daily.       SSRI Refill Protocol  Passed - 1/30/2020 12:43 PM        Passed - PCP or prescribing provider visit in last year     Last office visit with prescriber/PCP: 1/2/2020 Yolande Madrigal PA-C OR same dept: 1/22/2020 Khadar Hutson MD OR same specialty: 1/22/2020 Khadar Hutson MD  Last physical: Visit date not found Last MTM visit: Visit date not found   Next visit within 3 mo: Visit date not found  Next physical within 3 mo: Visit date not found  Prescriber OR PCP: Yolande Madrigal PA-C  Last diagnosis associated with med order: 1. Mild episode of recurrent major depressive disorder (H)  - FLUoxetine (PROZAC) 20 MG capsule; Take 3 capsules (60 mg total) by mouth daily.; Refill: 0    If protocol passes may refill for 12 months if within 3 months of last provider visit (or a total of 15 months).

## 2021-06-05 NOTE — PROGRESS NOTES
Assessment/Plan:     Problem List Items Addressed This Visit     Surveillance for birth control, oral contraceptives     With current illness.  Reviewed with patient that she needs to use alternative form of birth control until she has completed the current pill pack and started on next pill pack.  Also reminded patient that the effectiveness of the oral contraceptive is lower given her BMI above 35 and as such recommended the use of barrier contraception in addition to increase effectiveness of contraception choices.           Other Visit Diagnoses     Influenza B    -  Primary    Given history of reactive airway disease as a child and clinic.  Will increase to tapering dose of prednisone.  Add Tamiflu.  Continue albuterol.  Also discussed consideration of getting flu shot in the future and yearly.    Relevant Medications    albuterol (PROAIR HFA;PROVENTIL HFA;VENTOLIN HFA) 90 mcg/actuation inhaler    predniSONE (DELTASONE) 20 MG tablet    oseltamivir (TAMIFLU) 75 MG capsule    albuterol (PROVENTIL) 2.5 mg /3 mL (0.083 %) nebulizer solution    Other Relevant Orders    Influenza A/B Rapid Test- Nasal Swab (Completed)        Return in about 30 days (around 3/1/2020) for Annual physical.    Subjective:   24 y.o. female presents for continued congestion and cough.  She was started on prednisone 2 days ago at 40 mg daily.  Has not noticed any benefit.  She is using her albuterol inhaler approximately every 4 hours when she uses it improves but then within 4 hours the coughing restarts.  Cough is nonproductive.  Still feeling fevers and chills.  Patient has not had any recent travel.  Denies any swelling in the extremities.  Previous treatment and notes were obtained through care everywhere with patient's permission through release of information.  Patient did not get her flu shot this year.        Review of Systems   Constitutional: Positive for chills and fever.   HENT: Negative for dental problem, facial swelling,  "hearing loss and sinus pain.    Eyes: Negative for photophobia and visual disturbance.   Respiratory: Positive for cough and wheezing. Negative for choking.    Cardiovascular: Negative for chest pain, palpitations and leg swelling.   Gastrointestinal: Negative for abdominal pain.   Endocrine: Negative for polyuria.   Musculoskeletal: Positive for myalgias.        History     Reviewed By Date/Time Sections Reviewed    Skip Moody DO 1/31/2020 10:19 AM Medical, Surgical, Tobacco, Family, Socioeconomic    DaphneAwais duque Jr., [facility] 1/31/2020 10:00 AM Tobacco           Objective:     Vitals:    01/31/20 0956   BP: 118/74   Pulse: 94   Resp: 16   Temp: 98.9  F (37.2  C)   TempSrc: Oral   SpO2: 98%   Weight: (!) 258 lb 4.8 oz (117.2 kg)   Height: 5' 0.75\" (1.543 m)   LMP: 12/31/2019     Physical Exam  Vitals signs and nursing note reviewed.   Constitutional:       General: She is not in acute distress.     Appearance: Normal appearance.   HENT:      Head: Normocephalic and atraumatic.      Nose: Congestion present.      Mouth/Throat:      Pharynx: Posterior oropharyngeal erythema present. No oropharyngeal exudate.   Eyes:      Extraocular Movements: Extraocular movements intact.      Conjunctiva/sclera: Conjunctivae normal.   Neck:      Musculoskeletal: Normal range of motion.   Cardiovascular:      Rate and Rhythm: Normal rate and regular rhythm.      Heart sounds: Normal heart sounds.   Pulmonary:      Effort: Pulmonary effort is normal.      Breath sounds: Wheezing present. No rhonchi or rales.   Lymphadenopathy:      Cervical: No cervical adenopathy.   Skin:     Capillary Refill: Capillary refill takes less than 2 seconds.   Neurological:      Mental Status: She is alert and oriented to person, place, and time.   Psychiatric:         Mood and Affect: Mood normal.         This note has been dictated using voice recognition software. Any grammatical or context distortions are unintentional and inherent to " the software

## 2021-06-06 NOTE — TELEPHONE ENCOUNTER
RN cannot approve Refill Request    RN can NOT refill this medication historical medication requested. Last office visit: 1/2/2020 Yolande Madrigal PA-C Last Physical: Visit date not found Last MTM visit: Visit date not found Last visit same specialty: 1/31/2020 Skip Moody DO.  Next visit within 3 mo: Visit date not found  Next physical within 3 mo: Visit date not found      Amanda Myles, [facility] Triage/Med Refill 2/19/2020    Requested Prescriptions   Pending Prescriptions Disp Refills     divalproex (DEPAKOTE ER) 250 MG 24 hour tablet 90 tablet 2     Sig: Take 1 tablet (250 mg total) by mouth daily.       Divalproex/Valproic Acid Refill Protocol Passed - 2/19/2020  3:46 PM        Passed - LFT or AST or ALT in last 12 months     Albumin   Date Value Ref Range Status   07/17/2017 3.3 (L) 3.5 - 5.0 g/dL Final     Bilirubin, Total   Date Value Ref Range Status   07/17/2017 0.4 0.0 - 1.0 mg/dL Final     Alkaline Phosphatase   Date Value Ref Range Status   07/17/2017 70 45 - 120 U/L Final     AST   Date Value Ref Range Status   01/02/2020 12 0 - 40 U/L Final     ALT   Date Value Ref Range Status   01/02/2020 20 0 - 45 U/L Final     Protein, Total   Date Value Ref Range Status   07/17/2017 6.8 6.0 - 8.0 g/dL Final                Passed - CBC w/ plts (Hm2) in last 12 months      WBC   Date Value Ref Range Status   01/02/2020 13.3 (H) 4.0 - 11.0 thou/uL Final     Hemoglobin   Date Value Ref Range Status   01/02/2020 13.6 12.0 - 16.0 g/dL Final     Hematocrit   Date Value Ref Range Status   01/02/2020 41.9 35.0 - 47.0 % Final     Platelets   Date Value Ref Range Status   01/02/2020 324 140 - 440 thou/uL Final             Passed - PCP or prescribing provider visit in last 12 months       Last office visit with prescriber/PCP: 1/2/2020 Yolande Madrigal PA-C OR same dept: 1/22/2020 Khadar Hutson MD OR same specialty: 1/31/2020 Skip Moody,   Last physical: Visit date  not found Last MTM visit: Visit date not found   Next visit within 3 mo: Visit date not found  Next physical within 3 mo: Visit date not found  Prescriber OR PCP: Yolande Madrigal PA-C  Last diagnosis associated with med order: 1. Mild episode of recurrent major depressive disorder (H)  - FLUoxetine (PROZAC) 20 MG capsule; Take 3 capsules (60 mg total) by mouth daily.  Dispense: 270 capsule; Refill: 2    If protocol passes may refill for 12 months if within 3 months of last provider visit (or a total of 15 months).             norgestimate-ethinyl estradioL (TRI-ESTARYLLA) 0.18/0.215/0.25 mg-35 mcg (28) tablet 90 tablet 2     Sig: Take 1 tablet by mouth daily.       Oral Contraceptives Protocol Passed - 2/19/2020  3:46 PM        Passed - Visit with PCP or prescribing provider visit in last 12 months      Last office visit with prescriber/PCP: 1/2/2020 Yolande Madrigal PA-C OR same dept: 1/22/2020 Khadar Hutson MD OR same specialty: 1/31/2020 Skip Moody DO  Last physical: Visit date not found Last MTM visit: Visit date not found   Next visit within 3 mo: Visit date not found  Next physical within 3 mo: Visit date not found  Prescriber OR PCP: Yolande Madrigal PA-C  Last diagnosis associated with med order: 1. Mild episode of recurrent major depressive disorder (H)  - FLUoxetine (PROZAC) 20 MG capsule; Take 3 capsules (60 mg total) by mouth daily.  Dispense: 270 capsule; Refill: 2    If protocol passes may refill for 12 months if within 3 months of last provider visit (or a total of 15 months).           Signed Prescriptions Disp Refills    FLUoxetine (PROZAC) 20 MG capsule 270 capsule 2     Sig: Take 3 capsules (60 mg total) by mouth daily.       SSRI Refill Protocol  Passed - 2/19/2020  3:46 PM        Passed - PCP or prescribing provider visit in last year     Last office visit with prescriber/PCP: 1/2/2020 Yolande Madrigal PA-C OR same dept: 1/22/2020 Haresh  MD Khadar OR same specialty: 1/31/2020 Skip Moody, DO  Last physical: Visit date not found Last MTM visit: Visit date not found   Next visit within 3 mo: Visit date not found  Next physical within 3 mo: Visit date not found  Prescriber OR PCP: Yolande Madrigal PA-C  Last diagnosis associated with med order: 1. Mild episode of recurrent major depressive disorder (H)  - FLUoxetine (PROZAC) 20 MG capsule; Take 3 capsules (60 mg total) by mouth daily.  Dispense: 270 capsule; Refill: 2    If protocol passes may refill for 12 months if within 3 months of last provider visit (or a total of 15 months).                    Last office visit:1/2/20    ASSESMENT/PLAN:  1. Viral syndrome  fluticasone propionate (FLONASE) 50 mcg/actuation nasal spray   2. Mild episode of recurrent major depressive disorder (H)  FLUoxetine (PROZAC) 20 MG capsule     HM1(CBC and Differential)     Topiramate [Topamax ]     Valproic Acid (Depakene )     ALT (SGPT)     AST (SGOT)     Thyroid Manito     Ambulatory referral to Bariatric Care: Surgical and Non-Surgical     HM1(CBC and Differential)     Topiramate [Topamax ]     Valproic Acid (Depakene )     ALT (SGPT)     AST (SGOT)     Thyroid Cascade     HM1 (CBC with Diff)     PHQ9 Depression Screen   3. Spells of trembling  EEG Awake and Asleep (less than 41mins)   4. Binge eating disorder  Ambulatory referral to Bariatric Care: Surgical and Non-Surgical     PHQ9 Depression Screen   1.  Currently having some viral symptoms, advised to use Flonase and not to use any decongestants.  Increase fluids.  2.  We will recheck her valproic acid, topiramate, and CBC with ALT and AST today in case I will need to bridge her medications while she is waiting for her psychiatric referral.  Referral done [facility] psychiatry at [facility].  3.  We will get EEG and referral to neurology as needed.  She is on 3 antiseizure medications for her mood disorder and migraine headaches.  States that she  still is having episodes of tremor that last a few seconds at a time.  Could be related to anxiety.  4.  Referral given for bariatric 24-week diet program.  Follow-up in 2 weeks.  Yolande Madrigal, MS, PA-C 01/06/20         Instructions     Return in about 2 weeks (around 1/16/2020) for Depression, Recheck.       Last refill: No print 1/31/20    FLUoxetine (PROZAC) 20 MG capsule 90 capsule 3 1/31/2020  No   Sig - Route: Take 3 capsules (60 mg total) by mouth daily. - Oral   Class: No Print       Last refill: Historical    norgestimate-ethinyl estradiol (TRI-ESTARYLLA) 0.18/0.215/0.25 mg-35 mcg (28) Tab tablet     No   Sig - Route: Take by mouth. - Oral   Class: Historical Med       divalproex (DEPAKOTE ER) 250 MG 24 hour tablet   12/30/2019  --   Class: Historical Med

## 2021-06-06 NOTE — TELEPHONE ENCOUNTER
Medication Requests    Medication name: divalproex  Medication name: Fluoxetine  Medication name: Tri-Estarylla    Requested Pharmacy: [facility]  Reason for request: New Rx. These are listed as no print and historical medications.  When did you use medication last?:  n/a  Patient offered appointment:  n/a  Okay to leave a detailed message: yes

## 2021-06-06 NOTE — TELEPHONE ENCOUNTER
She is on 3 anti seizure medications used for her mood disorder and she needs to see psychiatry, does she have an appointment and can we expedite this appointment?

## 2021-06-06 NOTE — TELEPHONE ENCOUNTER
Who is calling:  Patient    Reason for Call:  Out of meds.    Date of last appointment with primary care: 01/02/2020    Okay to leave a detailed message: Yes

## 2021-06-06 NOTE — TELEPHONE ENCOUNTER
Referral Request  Type of referral: weight loss  Who s requesting: Patient  Why the request: Binge eating disorder and depression  Have you been seen for this request: Yes  Does patient have a [facility] on a [facility] to leave a detailed message?  Yes [phone]

## 2021-06-06 NOTE — TELEPHONE ENCOUNTER
Patient is asking for a weight loss referral and also mentions depression (which would not be addressed under a Bariatric referral) and that would require a referral to Behavioral Health, if you feel appropriate place order(s) or if patient needs to be seen in clinic first I can assist in scheduling.  Thanks!

## 2021-06-06 NOTE — TELEPHONE ENCOUNTER
Refill request already responded to.  Norgestimate-ethinyl estradiol filled 2/21/2020 #90 tablets with 2 refills.    Patti Levine RN  Triage Nurse Advisor

## 2021-06-06 NOTE — TELEPHONE ENCOUNTER
RN cannot approve Refill Request    RN can NOT refill this medication med is not covered by policy/route to provider. Last office visit: 1/2/2020 Yolande Madrigal PA-C Last Physical: Visit date not found Last MTM visit: Visit date not found Last visit same specialty: 1/31/2020 Skip Moody DO.  Next visit within 3 mo: Visit date not found  Next physical within 3 mo: Visit date not found      Juanis Guerrero [facility] Connection Triage/Med Refill 3/13/2020    Requested Prescriptions   Pending Prescriptions Disp Refills     gabapentin (NEURONTIN) 300 MG capsule 60 capsule 1     Sig: Take 1 capsule (300 mg total) by mouth daily.       Gabapentin/Levetiracetam/Tiagabine Refill Protocol  Passed - 3/13/2020 10:37 AM        Passed - PCP or prescribing provider visit in past 12 months or next 3 months     Last office visit with prescriber/PCP: 1/2/2020 Yolande Madrigal PA-C OR same dept: 1/22/2020 Khadar Hutson MD OR same specialty: 1/31/2020 Skip Moody DO  Last physical: Visit date not found Last MTM visit: Visit date not found   Next visit within 3 mo: Visit date not found  Next physical within 3 mo: Visit date not found  Prescriber OR PCP: Yolande Madrigal PA-C  Last diagnosis associated with med order: 1. Moderate major depression (H)  - topiramate (TOPAMAX) 50 MG tablet; Take 1 tablet (50 mg total) by mouth daily.  Dispense: 90 tablet; Refill: 3  - gabapentin (NEURONTIN) 300 MG capsule; Take 1 capsule (300 mg total) by mouth daily.  Dispense: 60 capsule; Refill: 1    If protocol passes may refill for 12 months if within 3 months of last provider visit (or a total of 15 months).              Signed Prescriptions Disp Refills    topiramate (TOPAMAX) 50 MG tablet 90 tablet 3     Sig: Take 1 tablet (50 mg total) by mouth daily.       Topiramate Refill Protocol  Passed - 3/13/2020 10:37 AM        Passed - Bicarbonate/Electrolyte panel in last 12 months     Sodium   Date  Value Ref Range Status   01/22/2020 136 136 - 145 mmol/L Final     Potassium   Date Value Ref Range Status   01/22/2020 3.8 3.5 - 5.0 mmol/L Final     Chloride   Date Value Ref Range Status   01/22/2020 106 98 - 107 mmol/L Final     CO2   Date Value Ref Range Status   01/22/2020 21 (L) 22 - 31 mmol/L Final                Passed - PCP or prescribing provider visit in last 12 months or next 3 months     Last office visit with prescriber/PCP: 1/2/2020 Yolande Madrigal PA-C OR same dept: 1/22/2020 Khadar Hutson MD OR same specialty: 1/31/2020 Skip Moody DO  Last physical: Visit date not found Last MTM visit: Visit date not found   Next visit within 3 mo: Visit date not found  Next physical within 3 mo: Visit date not found  Prescriber OR PCP: Yolande Madrigal PA-C  Last diagnosis associated with med order: 1. Moderate major depression (H)  - topiramate (TOPAMAX) 50 MG tablet; Take 1 tablet (50 mg total) by mouth daily.  Dispense: 90 tablet; Refill: 3  - gabapentin (NEURONTIN) 300 MG capsule; Take 1 capsule (300 mg total) by mouth daily.  Dispense: 60 capsule; Refill: 1    If protocol passes may refill for 12 months if within 3 months of last provider visit (or a total of 15 months).             Passed - Serum creatinine in last 12 months     Creatinine   Date Value Ref Range Status   01/22/2020 0.59 (L) 0.60 - 1.10 mg/dL Final

## 2021-06-07 NOTE — TELEPHONE ENCOUNTER
Who is requesting the letter?  patient  Why is the letter needed? Stating that the patient has asthma and it's in her best interest to stay home.   How would you like this letter returned? My chart  Okay to leave a detailed message? Yes

## 2021-06-07 NOTE — TELEPHONE ENCOUNTER
Patient has asthma but she is young and otherwise healthy. She will need to contact her employer about her concerns.

## 2021-06-07 NOTE — TELEPHONE ENCOUNTER
Attempts were made to reach patient back in January 2020.  Patient never answered nor returned our calls for scheduling.  No appt has been or was made.

## 2021-06-07 NOTE — TELEPHONE ENCOUNTER
Called and informed pt of PCP message.  Pt will contact her HR and call back when she is able to get something put together

## 2021-06-08 NOTE — TELEPHONE ENCOUNTER
Patient Returning Call  Reason for call:  Patient called back.  Information relayed to patient:  n/a  Patient has additional questions:  Yes  If YES, what are your questions/concerns:  Calling on the status of this medication. States would like this sent to the [facility] in [place] today as she is out of this med.  Please call when sent.  Okay to leave a detailed message?: Yes

## 2021-06-08 NOTE — TELEPHONE ENCOUNTER
Medications sent to wrong pharmacy- teed up to correct pharmacy       hydrOXYzine HCL (ATARAX) 10 MG tablet  30 tablet  3  6/8/2020   No    Sig - Route: Take 1 tablet (10 mg total) by mouth every 6 (six) hours as needed for itching. - Oral    Sent to pharmacy as: hydrOXYzine HCL 10 mg tablet (ATARAX)    E-Prescribing Status: Receipt confirmed by pharmacy (6/8/2020  1:59 PM CDT)      FLUoxetine (PROZAC) 20 MG capsule  270 capsule  2  6/8/2020  3/5/2021  No    Sig - Route: Take 3 capsules (60 mg total) by mouth daily. - Oral    Sent to pharmacy as: FLUoxetine 20 mg capsule (PROzac)    E-Prescribing Status: Receipt confirmed by pharmacy (6/8/2020  1:59 PM CDT)      divalproex (DEPAKOTE ER) 250 MG 24 hour tablet  90 tablet  2  6/8/2020  3/5/2021  No    Sig - Route: Take 1 tablet (250 mg total) by mouth daily. - Oral    Sent to pharmacy as: divalproex  mg tablet,extended release 24 hr (DEPAKOTE ER)    E-Prescribing Status: Receipt confirmed by pharmacy (6/8/2020  1:59 PM CDT)

## 2021-06-08 NOTE — TELEPHONE ENCOUNTER
Triage call:   She moved and lost the inhaler in the process of moving. She is requesting a refill of the inhaler  She reports that her asthma is worse than normal. Exacerbation over the last couple days- she reports that this is par for the course with her asthma   She reports that normally she has shortness of breath but she has noticed an exacerbation and some wheezing when she lays down  She states that the last time she was able to use her inhaler was beginning of March-     Triaged to schedule a virtual video visit with a provider - reviewed additional care advice with patient and she verbalizes understanding. Patient warm transferred to scheduling for appointment.     Video visit with PCP @ 8:50 am today     Lilian Hammer RN BSBA Care Connection Triage/Med Refill 6/1/2020 7:53 AM    COVID 19 Nurse Triage Plan/Patient Instructions    Please be aware that novel coronavirus (COVID-19) may be circulating in the community. If you develop symptoms such as fever, cough, or SOB or if you have concerns about the presence of another infection including coronavirus (COVID-19), please contact your health care provider or visit [email or web address].     Disposition/Instructions    Patient to have scheduled Telephone Visit with a provider. Follow System Ambulatory Workflow for COVID 19.     The clinic staff will assist you to schedule an appointment to complete the Telephone Visit with a provider during normal clinic hours.       Call Back If: Your symptoms worsen before you are able to complete your Telephone Visit with a provider.        Thank you for limiting contact with others, wearing a simple mask to cover your cough, practice good hand hygiene habits and accessing our virtual services where possible to limit the spread of this virus.    For more information about COVID19 and options for caring for yourself at home, please visit the CDC website at https://www.cdc.gov/coronavirus/2019-ncov/about/steps-when-sick.html  For  more options for care at [facility], please visit our website at [email or web address]    For more information, please use the [place] Department of Health COVID-19 Website: https://www.health.[facility].[place].us/diseases/coronavirus/index.html  [place] Department of Health ([facility]) COVID-19 Hotlines (Interpreters available):      Health questions: Phone Number: [phone] or [phone] and Hours: 7 a.m. to 7 p.m.    Schools and  questions: Phone Number: [phone] or [phone] and Hours 7 a.m. to 7 p.m.                          Reason for Disposition    MILD asthma attack (e.g., no SOB at rest, mild SOB with walking, speaks normally in sentences, mild wheezing) and persists > 24 hours on appropriate treatment    Protocols used: ASTHMA ATTACK-A-OH

## 2021-06-08 NOTE — TELEPHONE ENCOUNTER
Medication Request  Medication name: albuterol inhaler  Requested Pharmacy: [facility]  Reason for request: Inhaler got lost in a move.  Has none medication on hand.   When did you use medication last?:  unsure  Patient offered appointment:  no  Okay to leave a detailed message: yes

## 2021-06-08 NOTE — TELEPHONE ENCOUNTER
RN cannot approve Refill Request    RN can NOT refill this medication historical medication requested.       Emma Garcia, Care Connection Triage/Med Refill 5/29/2020    Requested Prescriptions   Pending Prescriptions Disp Refills     albuterol (PROAIR HFA;PROVENTIL HFA;VENTOLIN HFA) 90 mcg/actuation inhaler  0     Sig: Inhale 2 puffs as needed.       Albuterol/Levalbuterol Refill Protocol Passed - 5/29/2020  8:29 AM        Passed - PCP or prescribing provider visit in last year     Last office visit with prescriber/PCP: 1/2/2020 Yolande Madrigal PA-C OR same dept: 1/22/2020 Khadar Hutson MD OR same specialty: 1/31/2020 Skip Moody DO Last physical: Visit date not found       Next appt within 3 mo: Visit date not found  Next physical within 3 mo: Visit date not found  Prescriber OR PCP: Yolande Madrigal PA-C  Last diagnosis associated with med order: There are no diagnoses linked to this encounter.  If protocol passes may refill for 6 months if within 3 months of last provider visit (or a total of 9 months). If patient requesting >1 inhaler per month refill x 6 months and have patient make appointment with provider.

## 2021-06-08 NOTE — TELEPHONE ENCOUNTER
Called and lvm for patient to call back with an updated status on med request.    Having symptoms? If so, what symptoms?    Why is this being requested?    Please gather more information per providers request

## 2021-06-08 NOTE — TELEPHONE ENCOUNTER
Last Office Visit  06/01/2020 Yolande Madrigal PA-C  Notes:  1. Moderate persistent asthma with exacerbation  albuterol (PROAIR HFA;PROVENTIL HFA;VENTOLIN HFA) 90 mcg/actuation inhaler   2. Influenza B        Given history of reactive airway disease as a child and clinic.  Will increase to tapering dose of prednisone.  Add Tamiflu.     Restart inhaler and add a daily antihistamine to your allergy treatment.   Follow up as needed if not improving.      Last Filled:  hydrOXYzine HCl (ATARAX) 10 MG tablet  30 tablet  3  1/31/2020   No    Sig - Route: Take 1 tablet (10 mg total) by mouth every 6 (six) hours as needed for itching. - Oral    Sent to pharmacy as: hydrOXYzine HCl 10 mg tablet (ATARAX)    E-Prescribing Status: Receipt confirmed by pharmacy (1/31/2020 12:37 PM CST)      FLUoxetine (PROZAC) 20 MG capsule  270 capsule  2  2/19/2020  11/15/2020  No    Sig - Route: Take 3 capsules (60 mg total) by mouth daily. - Oral    Sent to pharmacy as: FLUoxetine 20 mg capsule (PROzac)    E-Prescribing Status: Receipt confirmed by pharmacy (2/19/2020  3:52 PM CST)      divalproex (DEPAKOTE ER) 250 MG 24 hour tablet  90 tablet  2  2/21/2020 11/17/2020  No    Sig - Route: Take 1 tablet (250 mg total) by mouth daily. - Oral    Sent to pharmacy as: divalproex  mg tablet,extended release 24 hr (DEPAKOTE ER)    E-Prescribing Status: Receipt confirmed by pharmacy (2/21/2020  8:51 AM CST)        Next OV:  Visit date not found        Medication teed up for provider signature

## 2021-06-08 NOTE — PROGRESS NOTES
"Roseline Tyler is a 24 y.o. female who is being evaluated via a billable video visit.    Chief Complaint   Patient presents with     Medication Refill     Inhaler for asthma     Roseline Tyler has allergies in the spirng and notes wheezing and feeling of shortness of breath in the spring also. She usually needs her inhailer during this season only and uses it about twice daily. She has not other symptoms currently, no chest pain with deep breath, sputum production.  ROS: negative for fever, cough, malaise, fatigue, myalgias and as in HPI.      The patient has been notified of following:     \"This video visit will be conducted via a call between you and your physician/provider. We have found that certain health care needs can be provided without the need for an in-person physical exam.  This service lets us provide the care you need with a video conversation.  If a prescription is necessary we can send it directly to your pharmacy.  If lab work is needed we can place an order for that and you can then stop by our lab to have the test done at a later time.    Video visits are billed at different rates depending on your insurance coverage. Please reach out to your insurance provider with any questions.    If during the course of the call the physician/provider feels a video visit is not appropriate, you will not be charged for this service.\"    Patient has given verbal consent to a Video visit? Yes    Patient would like to receive their AVS by AVS Preference: Mail a copy.    Patient would like the video invitation sent by: Text to cell phone: [phone]    Will anyone else be joining your video visit? No      Video Start Time: 0910    Additional provider notes: GENERAL: Healthy, alert and no distress  EYES: Eyes grossly normal to inspection. No discharge or erythema, or obvious scleral/conjunctival abnormalities.  RESP: No audible wheeze, cough, or visible cyanosis.  No visible retractions or increased work of " breathing.    NEURO: Cranial nerves grossly intact. Mentation and speech appropriate for age.  PSYCH: Mentation appears normal, affect normal/bright, judgement and insight intact, normal speech and appearance well-groomed  In the past 4 weeks, how much of the time did your asthma keep you from getting as much done at work, school, or at home?: None of the time  During the past 4 weeks, how often have you had shortness of breath?: Once a day  During the past 4 weeks, how often did your asthma symptoms (wheezing, coughing, shortness of breath, chest tightness or pain) wake you up at night or earlier in the morning?: Once or twice  During the past 4 weeks, how often have you used your rescue inhaler or nebulizer medication (such as albuterol)?: Not at all  How would you rate your asthma control during the past 4 weeks?: Poorly controlled  ACT Total Score: (!) 18  In the past 12 months, have you visited the emergency room due to your asthma?: No  In the past 12 months, have you been hospitalized due to your asthma?: No        Assessment and Plan:    1. Moderate persistent asthma with exacerbation  albuterol (PROAIR HFA;PROVENTIL HFA;VENTOLIN HFA) 90 mcg/actuation inhaler   2. Influenza B      Given history of reactive airway disease as a child and clinic.  Will increase to tapering dose of prednisone.  Add Tamiflu.     Restart inhaler and add a daily antihistamine to your allergy treatment.   Follow up as needed if not improving.  Video-Visit Details    Type of service:  Video Visit    Video End Time (time video stopped): 9:15 AM  Originating Location (pt. Location): Home    Distant Location (provider location):  [facility]     Platform used for Video Visit: Samira Madrigal PA-C

## 2021-06-08 NOTE — TELEPHONE ENCOUNTER
Patient has run out of her medications below, is requesting review ASAP today please.      Refill Request  Did you contact pharmacy: Yes  Medication name:   Requested Prescriptions     Pending Prescriptions Disp Refills     hydrOXYzine HCL (ATARAX) 10 MG tablet 30 tablet 3     Sig: Take 1 tablet (10 mg total) by mouth every 6 (six) hours as needed for itching.     FLUoxetine (PROZAC) 20 MG capsule 270 capsule 2     Sig: Take 3 capsules (60 mg total) by mouth daily.     divalproex (DEPAKOTE ER) 250 MG 24 hour tablet 90 tablet 2     Sig: Take 1 tablet (250 mg total) by mouth daily.     Who prescribed the medication: Yolande Madrigal PA-C    Requested Pharmacy: [facility]  Is patient out of medication: Yes  Patient notified refills processed in 3 business days:  yes  Okay to leave a detailed message: yes

## 2021-06-08 NOTE — TELEPHONE ENCOUNTER
Refill Request: Proair inhaler   Last filled: doesn't appear to have been filled by a provider here.   Last visit: 1.31.2020

## 2021-06-08 NOTE — TELEPHONE ENCOUNTER
Medication Request  Medication name:    Disp  Refills  Start  End  BEATRIZ    divalproex (DEPAKOTE ER) 250 MG 24 hour tablet  90 tablet  2  6/8/2020  3/5/2021  No    Sig - Route: Take 1 tablet (250 mg total) by mouth daily. - Oral    Sent to pharmacy as: divalproex  mg tablet,extended release 24 hr (DEPAKOTE ER)    E-Prescribing Status: Receipt confirmed by pharmacy (6/8/2020  1:59 PM CDT)       Disp  Refills  Start  End     FLUoxetine (PROZAC) 20 MG capsule  270 capsule  2  6/8/2020  3/5/2021     Sig - Route: Take 3 capsules (60 mg total) by mouth daily. - Oral     Sent to pharmacy as: FLUoxetine 20 mg capsule (PROzac)     E-Prescribing Status: Receipt confirmed by pharmacy (6/8/2020  1:59 PM CDT)        Disp  Refills  Start  End     hydrOXYzine HCL (ATARAX) 10 MG tablet  30 tablet  3  6/8/2020      Sig - Route: Take 1 tablet (10 mg total) by mouth every 6 (six) hours as needed for itching. - Oral     Sent to pharmacy as: hydrOXYzine HCL 10 mg tablet (ATARAX)     E-Prescribing Status: Receipt confirmed by pharmacy (6/8/2020  1:59 PM CDT)         Requested Pharmacy: [facility]  Reason for request: change of pharmacy, caller stated that patient wanted medication sent here but it was sent to a different location   When did you use medication last?:    Patient offered appointment:  patient declined  Okay to leave a detailed message: yes

## 2021-06-09 NOTE — TELEPHONE ENCOUNTER
"  Reason for Disposition    Neurologic deficit that was brief (now gone), ANY of the following: * Weakness of the face, arm, or leg on one side of the body * Numbness of the face, arm, or leg on one side of the body * Loss of speech or garbled speech    MILD difficulty breathing (e.g., minimal/no SOB at rest, SOB with walking, pulse < 100) of new onset or worse than normal    [1] Numbness (i.e., loss of sensation) of the face, arm / hand, or leg / foot on one side of the body AND [2] sudden onset AND [3] brief (now gone)    Additional Information    Negative: Confusion, disorientation, or hallucinations is the main symptom    Negative: Dizziness is the main symptom    Negative: Followed a head injury within last 3 days    Negative: Headache (with neurologic deficit)    Negative: Unable to urinate (or only a few drops) and bladder feels very full    Negative: Loss of control of bowel or bladder (i.e., incontinence) of new onset    Negative: Back pain with numbness (loss of sensation) in groin or rectal area    Negative: Patient sounds very sick or weak to the triager    Negative: MODERATE difficulty breathing (e.g., speaks in phrases, SOB even at rest, pulse 100-120) of new onset or worse than normal    Negative: Wheezing can be heard across the room    Negative: Drooling or spitting out saliva (because can't swallow)    Negative: Any history of prior \"blood clot\" in leg or lungs    Negative: Recent illness requiring prolonged bedrest (i.e., immobilization)    Negative: Hip or leg fracture in past 2 months (e.g., or had cast on leg or ankle)    Negative: Major surgery in the past month    Negative: Recent long-distance travel with prolonged time in car, bus, plane, or train (i.e., within past 2 weeks; 6 or  more hours duration)    Negative: Extra heart beats OR irregular heart beating   (i.e., \"palpitations\")    Negative: Fever > 103 F (39.4 C)    Negative: Fever > 101 F (38.3 C) and over 60 years of age    Negative: " Fever > 100.0 F (37.8 C) and bedridden (e.g., nursing home patient, stroke, chronic illness, recovering from surgery)    Negative: Fever > 100.0 F (37.8 C) and diabetes mellitus or weak immune system (e.g., HIV positive, cancer chemo, splenectomy, organ transplant, chronic steroids)    Negative: Periods where breathing stops and then resumes normally and bedridden (e.g., nursing home patient, CVA)    Negative: Pregnant or postpartum (from 0 to 6 weeks after delivery)    Negative: Patient sounds very sick or weak to the triager    Protocols used: NEUROLOGIC DEFICIT-A-OH, BREATHING DIFFICULTY-A-OH, NEUROLOGIC DEFICIT-A-AH

## 2021-06-09 NOTE — PROGRESS NOTES
Roseline Tyler is a 24 y.o. female who is being evaluated via a billable video visit.    Chief Complaint   Patient presents with     Suspected Covid     bodyache, trouble breathing, coughing, no test.      Roseline Tyler has been out of of work for one week with a viral illness and low grade fever of 99.0 F today. She is having congestion, shortness of breath, wheezing, myalgias.   She does not have diarrhea or rash.   She has had 2 negative COVID tests. She needs a note to go back to work.   She is a asthmatic and smokes MJ about 2 times a week, she is a rare alcohol user and denies any other illicit drug use.  Social History     Socioeconomic History     Marital status: Single     Spouse name: Not on file     Number of children: 0     Years of education: Not on file     Highest education level: Not on file   Occupational History     Occupation:      Employer: [employer]   Social Needs     Financial resource strain: Not on file     Food insecurity     Worry: Not on file     Inability: Not on file     Transportation needs     Medical: Not on file     Non-medical: Not on file   Tobacco Use     Smoking status: Never Smoker     Smokeless tobacco: Never Used   Substance and Sexual Activity     Alcohol use: Not Currently     Drug use: Yes     Frequency: 2.0 times per week     Types: Marijuana     Sexual activity: Yes     Partners: Male     Birth control/protection: Pill   Lifestyle     Physical activity     Days per week: Not on file     Minutes per session: Not on file     Stress: Not on file   Relationships     Social connections     Talks on phone: Not on file     Gets together: Not on file     Attends [employer] service: Not on file     Active member of club or organization: Not on file     Attends meetings of clubs or organizations: Not on file     Relationship status: Not on file     Intimate partner violence     Fear of current or ex partner: Not on file     Emotionally  "abused: Not on file     Physically abused: Not on file     Forced sexual activity: Not on file   Other Topics Concern     Not on file   Social History Narrative     Not on file           The patient has been notified of following:     \"This video visit will be conducted via a call between you and your physician/provider. We have found that certain health care needs can be provided without the need for an in-person physical exam.  This service lets us provide the care you need with a video conversation.  If a prescription is necessary we can send it directly to your pharmacy.  If lab work is needed we can place an order for that and you can then stop by our lab to have the test done at a later time.    Video visits are billed at different rates depending on your insurance coverage. Please reach out to your insurance provider with any questions.    If during the course of the call the physician/provider feels a video visit is not appropriate, you will not be charged for this service.\"    Patient has given verbal consent to a Video visit? Yes  How would you like to obtain your AVS? AVS Preference: Alonzohart.  Patient would like the video invitation sent by: Send to e-mail at: [email or web address]  Will anyone else be joining your video visit? No      Video Start Time: 1425    Additional provider notes: GENERAL: Healthy, alert and no distress  EYES: Eyes grossly normal to inspection. No discharge or erythema, or obvious scleral/conjunctival abnormalities.  RESP: No audible wheeze, cough, or visible cyanosis.  No visible retractions or increased work of breathing.    NEURO: Cranial nerves grossly intact. Mentation and speech appropriate for age.  PSYCH: Mentation appears normal, affect normal/bright, judgement and insight intact, normal speech and appearance well-groomed      Video-Visit Details  Assessment and Plan:  1. Bronchitis, chronic obstructive, with exacerbation (H)       Discussed current symptoms and potential " bronchitis. Will treat with albuterol and doxycycline. She will remain off work until fever resolves and note given for potential start back to work on 7/6/20 if no fever. Recommended no alcohol or MJ use during treatment and she voices understanding.  Tylenol for body aches 1000 mg every 6-8 hours.  Type of service:  Video Visit    Video End Time (time video stopped): 2:49 PM  Originating Location (pt. Location): car    Distant Location (provider location):  [facility]     Platform used for Video Visit: David Madrigal PA-C

## 2021-06-09 NOTE — TELEPHONE ENCOUNTER
"Patient calls today with a variety of symptoms. She starts off by noting symptoms consistent with COVID. States she has shortness of breath, intermittent wheezing and pain with breathing. She also notes loss of taste. These symptoms have been present for about a week. She was tested for COVID on 6/22 and 6/27 and both tests were negative. She is speaking comfortably over the phone in full sentences. I do not hear audible wheezing or trouble breathing. Patient mentions some new concerning symptoms she had this morning. She states when she woke up today she felt dizzy. She states one side of her face was \"numb and droopy.\" She said this lasted about 4 hours. Is not having those symptoms at this time.    I advised patient to be seen in ED at this time. She sounds like an otherwise healthy young woman, however the symptoms she mentioned from this morning are red flag symptoms for stroke. It would be safest for her to be seen in ED setting for further workup to rule this out. Furthermore patient's breathing symptoms should be assessed as well. She verbalizes understanding of this plan. Plans to have family member drive her to the ED to be seen.    Paula Ellis RN    "

## 2021-06-10 NOTE — TELEPHONE ENCOUNTER
FYI - Status Update  Who is Calling: Patient  Update: Patient stated she is out the gabapentin and would like this sent in as soon as possible.   Okay to leave a detailed message?:  No

## 2021-06-10 NOTE — TELEPHONE ENCOUNTER
Patient calls today reporting body aches, stuffy nose, and sore throat and shortness of breath. She states she had exposure to a confirmed COVID case within the last 2 weeks.She has been using her albuterol inhaler and nebs quite frequently to manage the breathing. She speaks comfortably in full sentences over the phone.  Patient asks for antibiotic today for symptoms.     I advised that patient have telephone visit with provider first to assess and diagnose symptoms. If her symptoms are related to COVID then an antibiotic would not be indicated at this time. The provider would first have to diagnose the cause of these symptoms before offering treatment. Patient verbalizes understanding of this advice. She agrees to do telephone visit today.    Warm transferred to scheduling. Scheduling unable to schedule telephone visit as patient is currently in [place]. I recommended that patient reach out to a clinic or provider near her that could help with her symptoms. I advised patient to be seen in the emergency room if the breathing gets worse or she becomes unable to manage the breathing at home with her inhaler. Patient was understanding of this advice.    Paula Ellis RN          Reason for Disposition    MILD difficulty breathing (e.g., minimal/no SOB at rest, SOB with walking, pulse <100)    Additional Information    Negative: SEVERE difficulty breathing (e.g., struggling for each breath, speaks in single words)    Negative: Difficult to awaken or acting confused (e.g., disoriented, slurred speech)    Negative: Bluish (or gray) lips or face now    Negative: Shock suspected (e.g., cold/pale/clammy skin, too weak to stand, low BP, rapid pulse)    Negative: Sounds like a life-threatening emergency to the triager    Negative: SEVERE or constant chest pain or pressure (Exception: mild central chest pain, present only when coughing)    Negative: MODERATE difficulty breathing (e.g., speaks in phrases, SOB even at rest,  pulse 100-120)    Negative: Patient sounds very sick or weak to the triager    Protocols used: CORONAVIRUS (COVID-19) DIAGNOSED OR [id number] 5.16.20

## 2021-06-10 NOTE — TELEPHONE ENCOUNTER
Refill Approved    Rx renewed per Medication Renewal Policy. Medication was last renewed on 1/30/20.    Emma Garcia, [facility] Connection Triage/Med Refill 8/6/2020     Requested Prescriptions   Pending Prescriptions Disp Refills     gabapentin (NEURONTIN) 300 MG capsule 60 capsule 1     Sig: Take 1 capsule (300 mg total) by mouth daily.       Gabapentin/Levetiracetam/Tiagabine Refill Protocol  Passed - 8/6/2020 11:45 AM        Passed - PCP or prescribing provider visit in past 12 months or next 3 months     Last office visit with prescriber/PCP: 1/2/2020 Yolande Madrigal PA-C OR same dept: 1/22/2020 Khadar Hutson MD OR same specialty: 1/31/2020 Skip Moody DO  Last physical: Visit date not found Last MTM visit: Visit date not found   Next visit within 3 mo: Visit date not found  Next physical within 3 mo: Visit date not found  Prescriber OR PCP: Yolande Madrigal PA-C  Last diagnosis associated with med order: 1. Moderate major depression (H)  - gabapentin (NEURONTIN) 300 MG capsule; Take 1 capsule (300 mg total) by mouth daily.  Dispense: 60 capsule; Refill: 1    If protocol passes may refill for 12 months if within 3 months of last provider visit (or a total of 15 months).

## 2021-06-10 NOTE — TELEPHONE ENCOUNTER
Refill Approved    Rx renewed per Medication Renewal Policy. Medication was last renewed on 6/1/20, last OV 6/30/20.    Geraldine Patel, [facility] Connection Triage/Med Refill 8/23/2020     Requested Prescriptions   Pending Prescriptions Disp Refills     albuterol (PROAIR HFA;PROVENTIL HFA;VENTOLIN HFA) 90 mcg/actuation inhaler 1 each 6     Sig: Inhale 2 puffs as needed.       Albuterol/Levalbuterol Refill Protocol Passed - 8/20/2020 12:21 PM        Passed - PCP or prescribing provider visit in last year     Last office visit with prescriber/PCP: 1/2/2020 Yolande Madrigal PA-C OR same dept: 1/22/2020 Khadar Hutson MD OR same specialty: 1/31/2020 Skip Moody DO Last physical: Visit date not found       Next appt within 3 mo: Visit date not found  Next physical within 3 mo: Visit date not found  Prescriber OR PCP: Yolande Madrigal PA-C  Last diagnosis associated with med order: 1. Mild intermittent asthma with exacerbation  - albuterol (PROAIR HFA;PROVENTIL HFA;VENTOLIN HFA) 90 mcg/actuation inhaler; Inhale 2 puffs as needed.  Dispense: 1 each; Refill: 6    If protocol passes may refill for 6 months if within 3 months of last provider visit (or a total of 9 months). If patient requesting >1 inhaler per month refill x 6 months and have patient make appointment with provider.

## 2021-06-10 NOTE — TELEPHONE ENCOUNTER
FYI - Status Update  Who is Calling: Patient  Update: Patient stated she will need a new prescription submitted to [facility] in [place]. Current prescription is at [facility] [place].  Okay to leave a detailed message?:  No return call needed

## 2021-06-11 NOTE — TELEPHONE ENCOUNTER
Last Office Visit  6/30/2020 Yolande Madrigal Pa-C- Suspected COVID    Last visit pertaining to medication 1.2.2020- Yolande Madrigal PA-C   Notes:    Establish Care       coming here from [place], depression and anxiety, binge eating disorder, would like to be seen here and get her medications here      Thyroid Problem       has been told that her thyroid is enlarged, would like follow-up on this          Last Filled:  gabapentin (NEURONTIN) 300 MG capsule  90 capsule  3  8/6/2020   No    Sig - Route: Take 1 capsule (300 mg total) by mouth daily. - Oral    Sent to pharmacy as: gabapentin 300 mg capsule (NEURONTIN)    E-Prescribing Status: Receipt confirmed by pharmacy (8/6/2020 11:47 AM CDT)      The pharmacy calls as they have been contacted by the patient, who has stated that her mother had picked the prescription up and then mailed to the patient and that the medication was never received. Is now asking for another refill at this time.

## 2021-06-11 NOTE — TELEPHONE ENCOUNTER
Medication Question or Clarification    Who is calling:   Pharmacy    What medication are you calling about (include dose and sig)?:   albuterol (PROAIR HFA;PROVENTIL HFA;VENTOLIN HFA) 90 mcg/actuation inhaler  1 each  6  8/23/2020      Sig - Route: Inhale 2 puffs as needed        Who prescribed the medication?:   PCP    What is your question/concern?:   Pharmacy is requesting the frequency of use.     Requested Pharmacy: [id number] [address] [facility] [address]     Okay to leave a detailed message?: Yes    Please send script to patients pharmacy if appropriate and notify the patient to the outcome of this request.

## 2021-06-11 NOTE — TELEPHONE ENCOUNTER
Please advise establish care visit sometime in the next 3 months.   Thanks,  Skip Flores MD  For Madrigal

## 2021-06-11 NOTE — TELEPHONE ENCOUNTER
Refill Request  Did you contact pharmacy: No  Medication name:   Requested Prescriptions     Pending Prescriptions Disp Refills     FLUoxetine (PROZAC) 20 MG capsule 270 capsule 2     Sig: Take 3 capsules (60 mg total) by mouth daily.     topiramate (TOPAMAX) 50 MG tablet 90 tablet 3     Sig: Take 1 tablet (50 mg total) by mouth daily.     Who prescribed the medication: Yolande Padilla NP  [facility]ed [facility]acy: [facility]  Is patient out of medication: Yes   Please send both Rx's to the pharmacy today as she has been out of them for 2 weeks.  Patient notified refills processed in 3 business days:  yes  Okay to leave a detailed message: yes  Please call patient when meds are sent to the pharmacy.

## 2021-06-11 NOTE — TELEPHONE ENCOUNTER
Medication Question or Clarification  Who is calling: Pharmacy  What medication are you calling about (include dose and sig)?:   gabapentin (NEURONTIN) 300 MG capsule  90 capsule  3  8/6/2020      Sig - Route: Take 1 capsule (300 mg total) by mouth daily. - Oral     Sent to pharmacy as: gabapentin 300 mg capsule (NEURONTIN)     E-Prescribing Status: Receipt confirmed by pharmacy (8/6/2020 11:47 AM CDT        Who prescribed the medication?: Yolande Madrigal PA-C    What is your question/concern?: The pharmacy calls as they have been contacted by the patient, who has stated that her mother had picked the prescription up and then mailed to the patient and that the medication was never received. Is now asking for another refill at this time.  Requested Pharmacy: [id number]  Okay to leave a detailed message?: No

## 2021-06-11 NOTE — TELEPHONE ENCOUNTER
Called and advised patient 90 day RX sent to the pharmacy, she will need an office visit sometime in the next three months for further refills.    Patient expressed understanding.    Consuelo Wilks, CMA

## 2021-06-11 NOTE — TELEPHONE ENCOUNTER
Refill Approved    Rx renewed per Medication Renewal Policy. Medication was last renewed on 6/9/20.3/13/20.    Emma Garcia, [facility] Connection Triage/Med Refill 9/25/2020     Requested Prescriptions   Pending Prescriptions Disp Refills     FLUoxetine (PROZAC) 20 MG capsule 270 capsule 2     Sig: Take 3 capsules (60 mg total) by mouth daily.       SSRI Refill Protocol  Passed - 9/25/2020 10:38 AM        Passed - PCP or prescribing provider visit in last year     Last office visit with prescriber/PCP: 1/2/2020 Yolande Madrigal PA-C OR same dept: 1/22/2020 Khadar Hutson MD OR same specialty: 1/31/2020 Skip Moody DO  Last physical: Visit date not found Last MTM visit: Visit date not found   Next visit within 3 mo: Visit date not found  Next physical within 3 mo: Visit date not found  Prescriber OR PCP: Yolande Madrigal PA-C  Last diagnosis associated with med order: 1. Mild episode of recurrent major depressive disorder (H)  - FLUoxetine (PROZAC) 20 MG capsule; Take 3 capsules (60 mg total) by mouth daily.  Dispense: 270 capsule; Refill: 2    2. Moderate major depression (H)  - topiramate (TOPAMAX) 50 MG tablet; Take 1 tablet (50 mg total) by mouth daily.  Dispense: 90 tablet; Refill: 3    If protocol passes may refill for 12 months if within 3 months of last provider visit (or a total of 15 months).                topiramate (TOPAMAX) 50 MG tablet 90 tablet 3     Sig: Take 1 tablet (50 mg total) by mouth daily.       Topiramate Refill Protocol  Passed - 9/25/2020 10:38 AM        Passed - Bicarbonate/Electrolyte panel in last 12 months     Sodium   Date Value Ref Range Status   01/22/2020 136 136 - 145 mmol/L Final     Potassium   Date Value Ref Range Status   01/22/2020 3.8 3.5 - 5.0 mmol/L Final     Chloride   Date Value Ref Range Status   01/22/2020 106 98 - 107 mmol/L Final     CO2   Date Value Ref Range Status   01/22/2020 21 (L) 22 - 31 mmol/L Final                Passed - PCP or  prescribing provider visit in last 12 months or next 3 months     Last office visit with prescriber/PCP: 1/2/2020 Yolande Madrigal PA-C OR same dept: 1/22/2020 Khadar Hutson MD OR same specialty: 1/31/2020 Skip Moody DO  Last physical: Visit date not found Last MTM visit: Visit date not found   Next visit within 3 mo: Visit date not found  Next physical within 3 mo: Visit date not found  Prescriber OR PCP: Yolande Madrigal PA-C  Last diagnosis associated with med order: 1. Mild episode of recurrent major depressive disorder (H)  - FLUoxetine (PROZAC) 20 MG capsule; Take 3 capsules (60 mg total) by mouth daily.  Dispense: 270 capsule; Refill: 2    2. Moderate major depression (H)  - topiramate (TOPAMAX) 50 MG tablet; Take 1 tablet (50 mg total) by mouth daily.  Dispense: 90 tablet; Refill: 3    If protocol passes may refill for 12 months if within 3 months of last provider visit (or a total of 15 months).             Passed - Serum creatinine in last 12 months     Creatinine   Date Value Ref Range Status   01/22/2020 0.59 (L) 0.60 - 1.10 mg/dL Final

## 2021-06-14 NOTE — TELEPHONE ENCOUNTER
Had an acute visit for UTI symptoms with the patient yesterday.  I am not her PCP nor am I covering for her PCP.  Please forward to appropriate pool. Thank you

## 2021-06-14 NOTE — TELEPHONE ENCOUNTER
Refill Request: Gabapentin   Last filled: 9.29.2020 disp 90 no refills   Last visit:  1.14.2021 for UTI and med wsa not addressed   Last visit with Yolande Pruitt 6.1.2020

## 2021-06-14 NOTE — TELEPHONE ENCOUNTER
Called pt and LVM that pt is due for appointment with PCP -mailed letter to address on file    Mailed Est Care letter as well
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
## 2021-06-14 NOTE — TELEPHONE ENCOUNTER
Can we fill and do labs only?  Pt is due for Valproic level     Lab Results   Component Value Date    VALPROATE 17.5 (L) 01/02/2020       Pt was just seen for VV for UTI    Please advise - teed up Rx

## 2021-06-14 NOTE — TELEPHONE ENCOUNTER
Former patient of [facility] & has not established care with another provider.  Please assign refill request to covering provider per Clinic standard process.      RN cannot approve Refill Request    RN can NOT refill this medication Protocol failed and NO refill given. Last office visit: 1/2/2020 Yolande Madrigal PA-C Last Physical: Visit date not found Last MTM visit: Visit date not found Last visit same specialty: 1/31/2020 Skip Moody DO.  Next visit within 3 mo: Visit date not found  Next physical within 3 mo: Visit date not found      Emma Garcia, [facility] Connection Triage/Med Refill 1/14/2021    Requested Prescriptions   Pending Prescriptions Disp Refills     FLUoxetine (PROZAC) 20 MG capsule [Pharmacy Med Name: FLUoxetine HCl Oral Capsule 20 MG] 270 capsule 0     Sig: TAKE THREE CAPSULES BY MOUTH DAILY       SSRI Refill Protocol  Passed - 1/13/2021  4:52 PM        Passed - PCP or prescribing provider visit in last year     Last office visit with prescriber/PCP: 1/2/2020 Yolande Madrigal PA-C OR same dept: 1/22/2020 Khadar Hutson MD OR same specialty: 1/31/2020 Skip Moody DO  Last physical: Visit date not found Last MTM visit: Visit date not found   Next visit within 3 mo: Visit date not found  Next physical within 3 mo: Visit date not found  Prescriber OR PCP: Yolande Madrigal PA-C  Last diagnosis associated with med order: 1. Mild episode of recurrent major depressive disorder (H)  - FLUoxetine (PROZAC) 20 MG capsule [Pharmacy Med Name: FLUoxetine HCl Oral Capsule 20 MG]; TAKE THREE CAPSULES BY MOUTH DAILY   Dispense: 270 capsule; Refill: 0  - hydrOXYzine HCL (ATARAX) 10 MG tablet [Pharmacy Med Name: hydrOXYzine HCl Oral Tablet 10 MG]; TAKE ONE TABLET BY MOUTH EVERY SIX HOURS AS NEEDED for itching  Dispense: 30 tablet; Refill: 0  - divalproex (DEPAKOTE ER) 250 MG 24 hour tablet [Pharmacy Med Name: Divalproex Sodium ER Oral Tablet Extended Release 24 Hour  250 MG]; TAKE ONE TABLET BY MOUTH ONE TIME DAILY   Dispense: 90 tablet; Refill: 0    If protocol passes may refill for 12 months if within 3 months of last provider visit (or a total of 15 months).                hydrOXYzine HCL (ATARAX) 10 MG tablet [Pharmacy Med Name: hydrOXYzine HCl Oral Tablet 10 MG] 30 tablet 0     Sig: TAKE ONE TABLET BY MOUTH EVERY SIX HOURS AS NEEDED for itching       Antihistamine Refill Protocol Passed - 1/13/2021  4:52 PM        Passed - Patient has had office visit/physical in last year     Last office visit with prescriber/PCP: 1/2/2020 Yolande Madrigal PA-C OR same dept: 1/22/2020 Khadar Hutson MD OR same specialty: 1/31/2020 Skip Moody DO  Last physical: Visit date not found Last MTM visit: Visit date not found   Next visit within 3 mo: Visit date not found  Next physical within 3 mo: Visit date not found  Prescriber OR PCP: Yolande Madrigal PA-C  Last diagnosis associated with med order: 1. Mild episode of recurrent major depressive disorder (H)  - FLUoxetine (PROZAC) 20 MG capsule [Pharmacy Med Name: FLUoxetine HCl Oral Capsule 20 MG]; TAKE THREE CAPSULES BY MOUTH DAILY   Dispense: 270 capsule; Refill: 0  - hydrOXYzine HCL (ATARAX) 10 MG tablet [Pharmacy Med Name: hydrOXYzine HCl Oral Tablet 10 MG]; TAKE ONE TABLET BY MOUTH EVERY SIX HOURS AS NEEDED for itching  Dispense: 30 tablet; Refill: 0  - divalproex (DEPAKOTE ER) 250 MG 24 hour tablet [Pharmacy Med Name: Divalproex Sodium ER Oral Tablet Extended Release 24 Hour 250 MG]; TAKE ONE TABLET BY MOUTH ONE TIME DAILY   Dispense: 90 tablet; Refill: 0    If protocol passes may refill for 12 months if within 3 months of last provider visit (or a total of 15 months).                divalproex (DEPAKOTE ER) 250 MG 24 hour tablet [Pharmacy Med Name: Divalproex Sodium ER Oral Tablet Extended Release 24 Hour 250 MG] 90 tablet 0     Sig: TAKE ONE TABLET BY MOUTH ONE TIME DAILY       Divalproex/Valproic Acid  Refill Protocol Failed - 1/13/2021  4:52 PM        Failed - LFT or AST or ALT in last 12 months     Albumin   Date Value Ref Range Status   07/17/2017 3.3 (L) 3.5 - 5.0 g/dL Final     Bilirubin, Total   Date Value Ref Range Status   07/17/2017 0.4 0.0 - 1.0 mg/dL Final     Alkaline Phosphatase   Date Value Ref Range Status   07/17/2017 70 45 - 120 U/L Final     AST   Date Value Ref Range Status   01/02/2020 12 0 - 40 U/L Final     ALT   Date Value Ref Range Status   01/02/2020 20 0 - 45 U/L Final     Protein, Total   Date Value Ref Range Status   07/17/2017 6.8 6.0 - 8.0 g/dL Final                Failed - CBC w/ plts (Hm2) in last 12 months      WBC   Date Value Ref Range Status   01/02/2020 13.3 (H) 4.0 - 11.0 thou/uL Final     Hemoglobin   Date Value Ref Range Status   01/02/2020 13.6 12.0 - 16.0 g/dL Final     Hematocrit   Date Value Ref Range Status   01/02/2020 41.9 35.0 - 47.0 % Final     Platelets   Date Value Ref Range Status   01/02/2020 324 140 - 440 thou/uL Final             Passed - PCP or prescribing provider visit in last 12 months       Last office visit with prescriber/PCP: 1/2/2020 Yolande Madrigal PA-C OR same dept: 1/22/2020 Khadar Hutson MD OR same specialty: 1/31/2020 Skip Moody DO  Last physical: Visit date not found Last MTM visit: Visit date not found   Next visit within 3 mo: Visit date not found  Next physical within 3 mo: Visit date not found  Prescriber OR PCP: Yolande Madrigal PA-C  Last diagnosis associated with med order: 1. Mild episode of recurrent major depressive disorder (H)  - FLUoxetine (PROZAC) 20 MG capsule [Pharmacy Med Name: FLUoxetine HCl Oral Capsule 20 MG]; TAKE THREE CAPSULES BY MOUTH DAILY   Dispense: 270 capsule; Refill: 0  - hydrOXYzine HCL (ATARAX) 10 MG tablet [Pharmacy Med Name: hydrOXYzine HCl Oral Tablet 10 MG]; TAKE ONE TABLET BY MOUTH EVERY SIX HOURS AS NEEDED for itching  Dispense: 30 tablet; Refill: 0  - divalproex  (DEPAKOTE ER) 250 MG 24 hour tablet [Pharmacy Med Name: Divalproex Sodium ER Oral Tablet Extended Release 24 Hour 250 MG]; TAKE ONE TABLET BY MOUTH ONE TIME DAILY   Dispense: 90 tablet; Refill: 0    If protocol passes may refill for 12 months if within 3 months of last provider visit (or a total of 15 months).

## 2021-06-14 NOTE — TELEPHONE ENCOUNTER
Left message for patient to call back. When she calls back please relay message and help her set up an appointment.

## 2021-06-14 NOTE — TELEPHONE ENCOUNTER
Former patient of [facility] & has not established care with another provider.  Please assign refill request to covering provider per Clinic standard process.      Refill Approved    Rx renewed per Medication Renewal Policy. Medication was last renewed on 9/29/20.    Emma Garcia, [facility] Connection Triage/Med Refill 1/14/2021     Requested Prescriptions   Pending Prescriptions Disp Refills     gabapentin (NEURONTIN) 300 MG capsule [Pharmacy Med Name: Gabapentin Oral Capsule 300 MG] 90 capsule 0     Sig: TAKE ONE CAPSULE BY MOUTH ONE TIME DAILY       Gabapentin/Levetiracetam/Tiagabine Refill Protocol  Passed - 1/13/2021  4:52 PM        Passed - PCP or prescribing provider visit in past 12 months or next 3 months     Last office visit with prescriber/PCP: Visit date not found OR same dept: Visit date not found OR same specialty: Visit date not found  Last physical: Visit date not found Last MTM visit: Visit date not found   Next visit within 3 mo: Visit date not found  Next physical within 3 mo: Visit date not found  Prescriber OR PCP: Aspen Metcalf MD  Last diagnosis associated with med order: 1. Moderate major depression (H)  - gabapentin (NEURONTIN) 300 MG capsule [Pharmacy Med Name: Gabapentin Oral Capsule 300 MG]; TAKE ONE CAPSULE BY MOUTH ONE TIME DAILY   Dispense: 90 capsule; Refill: 0    If protocol passes may refill for 12 months if within 3 months of last provider visit (or a total of 15 months).

## 2021-06-16 PROBLEM — F33.0 MILD EPISODE OF RECURRENT MAJOR DEPRESSIVE DISORDER (H): Status: ACTIVE | Noted: 2018-03-01

## 2021-06-16 PROBLEM — Z30.41 SURVEILLANCE FOR BIRTH CONTROL, ORAL CONTRACEPTIVES: Status: ACTIVE | Noted: 2018-03-01

## 2021-06-16 PROBLEM — F50.819 BINGE EATING DISORDER: Status: ACTIVE | Noted: 2018-03-01

## 2021-06-16 NOTE — PROGRESS NOTES
Assessment:     1. Binge eating disorder  Ambulatory referral to Psychology   2. Mild episode of recurrent major depressive disorder  FLUoxetine (PROZAC) 10 MG capsule    FLUoxetine (PROZAC) 40 MG capsule   3. Surveillance for birth control, oral contraceptives  norgestimate-ethinyl estradiol (TRI-ESTARYLLA) 0.18/0.215/0.25 mg-35 mcg (28) Tab tablet       Return in about 6 months (around 9/1/2018) for Annual physical.    Plan:     Binge eating disorder  Overall doing well, but admits that she could use further counseling.  Referral placed.  Will restart back in 2 and continue the Prozac as previous at 50 mg daily.    Mild episode of recurrent major depressive disorder  Continue Prozac to 50 mg daily.  Will also start back into counseling as per eating disorder recommendations.    Surveillance for birth control, oral contraceptives  Mainly using for control of periods.  Also discussed the fact that with her weight, it does reduce the effectiveness of the oral birth control for contraception and therefore if becoming sexually active recommend using barrier contraception to help with contraception as well as prevention of STDs.    I have had an Advance Directives discussion with the patient.  The following are part of a depression follow up plan for the patient:  under care of mental health counselor  The following high BMI interventions were performed this visit: encouragement to exercise and weight monitoring    Subjective:       22 y.o. female presents for evaluation reestablish care, as well as follow-up on eating disorder, and refill of oral contraceptives and she uses for maintenance of her menstrual cycle.  Denies any homicidal or suicidal ideation.  She has been out of her Prozac for 2 days and does feel a little withdrawn.  She has not had any of her binge eating cravings while on the Prozac.  She did complete counseling through [facility] but has been out for a year.  She has not gone back and admits that  "she does get cravings once in a while and could use reminders for the behavioral aspects.  Denies any chest pain, shortness breath, dyspnea exertion, palpitations, nausea or vomiting.  Denies any swelling of the extremities.    The following portions of the patient's history were reviewed and updated as appropriate: allergies, current medications, past family history, past medical history, past social history, past surgical history and problem list.    Review of Systems  A 12 point comprehensive review of systems was negative except as noted.     History   Smoking Status     Never Smoker   Smokeless Tobacco     Never Used       Objective:      /76 (Patient Site: Left Arm, Patient Position: Sitting, Cuff Size: Adult Large)  Pulse 64  Temp 98.5  F (36.9  C) (Oral)   Resp 12  Ht 5' 1\" (1.549 m)  Wt (!) 237 lb 12.8 oz (107.9 kg)  LMP 02/26/2018 (Exact Date)  Breastfeeding? No  BMI 44.93 kg/m2  General appearance: alert, appears stated age and cooperative  Head: Normocephalic, without obvious abnormality, atraumatic  Eyes: conjunctivae/corneas clear. PERRL, EOM's intact. Fundi benign.  Ears: Normal canals bilaterally, bilateral tympanic membranes with previous scarring from PE tubes.  Good mobility, no erythema.  Nose: Nares normal. Septum midline. Mucosa normal. No drainage or sinus tenderness.  Throat: lips, mucosa, and tongue normal; teeth and gums normal  Neck: no adenopathy, supple, symmetrical, trachea midline and thyroid not enlarged, symmetric, no tenderness/mass/nodules  Lungs: clear to auscultation bilaterally  Heart: regular rate and rhythm, S1, S2 normal, no murmur, click, rub or gallop  Extremities: extremities normal, atraumatic, no cyanosis or edema  Pulses: 2+ and symmetric  Neurologic: Mental status: Alert, oriented, thought content appropriate       This note has been dictated using voice recognition software. Any grammatical or context distortions are unintentional and inherent to the " software

## 2021-06-17 NOTE — PATIENT INSTRUCTIONS - HE
Patient Instructions by Melissa Jorgensen FNP at 8/20/2019 10:10 AM     Author: Melissa Jorgensen FNP Service: -- Author Type: Nurse Practitioner    Filed: 8/20/2019 11:15 AM Encounter Date: 8/20/2019 Status: Signed    : Melissa Jorgensen FNP (Nurse Practitioner)       Patient Education     Acute Bronchitis  Your healthcare provider has told you that you have acute bronchitis. Bronchitis is infection or inflammation of the bronchial tubes (airways in the lungs). Normally, air moves easily in and out of the airways. Bronchitis narrows the airways, making it harder for air to flow in and out of the lungs. This causes symptoms such as shortness of breath, coughing up yellow or green mucus, and wheezing. Bronchitis can be acute or chronic. Acute means the condition comes on quickly and goes away in a short time, usually within 3 to 10 days. Chronic means a condition lasts a long time and often comes back.    What causes acute bronchitis?  Acute bronchitis almost always starts as a viral respiratory infection, such as a cold or the flu. Certain factors make it more likely for a cold or flu to turn into bronchitis. These include being very young, being elderly, having a heart or lung problem, or having a weak immune system. Cigarette smoking also makes bronchitis more likely.  When bronchitis develops, the airways become swollen. The airways may also become infected with bacteria. This is known as a secondary infection.  Diagnosing acute bronchitis  Your healthcare provider will examine you and ask about your symptoms and health history. You may also have a sputum culture to test the fluid in your lungs. Chest X-rays may be done to look for infection in the lungs.  Treating acute bronchitis  Bronchitis usually clears up as the cold or flu goes away. You can help feel better faster by doing the following:    Take medicine as directed. You may be told to take ibuprofen or other over-the-counter medicines. These  help relieve inflammation in your bronchial tubes. Your healthcare provider may prescribe an inhaler to help open up the bronchial tubes. Most of the time, acute bronchitis is caused by a viral infection. Antibiotics are usually not prescribed for viral infections.    Drink plenty of fluids, such as water, juice, or warm soup. Fluids loosen mucus so that you can cough it up. This helps you breathe more easily. Fluids also prevent dehydration.    Make sure you get plenty of rest.    Do not smoke. Do not allow anyone else to smoke in your home.  Recovery and follow-up  Follow up with your doctor as you are told. You will likely feel better in a week or two. But a dry cough can linger beyond that time. Let your doctor know if you still have symptoms (other than a dry cough) after 2 weeks, or if youre prone to getting bronchial infections. Take steps to protect yourself from future infections. These steps include stopping smoking and avoiding tobacco smoke, washing your hands often, and getting a yearly flu shot.  When to call your healthcare provider  Call the healthcare provider if you have any of the following:    Fever of 100.4 F (38.0 C) or higher, or as advised    Symptoms that get worse, or new symptoms    Trouble breathing    Symptoms that dont start to improve within a week, or within 3 days of taking antibiotics   Date Last Reviewed: 12/1/2016 2000-2017 [employer]. [address] [place] [address], [place] [address]. All rights reserved. This information is not intended as a substitute for professional medical care. Always follow your healthcare professional's instructions.

## 2021-06-17 NOTE — PROGRESS NOTES
Assessment/Plan:        Diagnoses and all orders for this visit:    Acute pharyngitis  -     Rapid Strep A Screen-Throat    Oral aphthous ulcer        Discussed findings and possible causes.  He also did a rapid strep and results were negative.  Will send that for culture.  Advised salt water gargle.  Tylenol or ibuprofen as needed for pain.  Recheck with PCP in a week if persistent or worse.  She was agreeable with the plans.  Subjective:    Patient ID: Roseline Tyler is a 22 y.o. female.    HPI    Roseline is here with concerns about sore throat and a mass in her throat.  She woke up yesterday morning and noted the discomfort.  When she checked, there was mass.  It seems to be the same size.  She also had fever of 100.1 yesterday.  Felt feverish this morning but was unable to check.  Cough started around 4 days ago with greenish secretions.  No hemoptysis.  Able to swallow with the discomfort.  No choking spells and able to eat.  She denies any exposure to anyone ill.  No recent travel.  Works at [employer] and mostly in the back and.  Has not taken anything for her symptoms.  No smoking.  History of asthma and mostly exercise-induced but has been in good control.    Review of Systems  As above otherwise negative.          Objective:    Physical Exam  /70 (Patient Site: Left Arm, Patient Position: Sitting, Cuff Size: Adult Large)  Pulse 93  Temp 98.2  F (36.8  C) (Oral)   Wt (!) 244 lb 1.6 oz (110.7 kg)  LMP 04/01/2018 (Exact Date)  SpO2 98%  Breastfeeding? No  BMI 46.12 kg/m2    Vital signs noted above. AAO ×3.  HEENT no nasal discharge, moist oral mucosa.  Superficial ulcer in her right posterior pharyngeal wall, 1 x 1 cm.  Ears:TMs intact, no fluid collection. Neck: Supple neck, nonpalpable cervical lymph nodes.  Lungs: Clear to auscultation bilateral.  Heart: S1-S2 regular rate and rhythm, no murmurs were noted.  Abdomen:  with bowel sounds.
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
## 2021-06-18 NOTE — PROGRESS NOTES
Assessment:   1. Acute non-recurrent frontal sinusitis      Plan:      Antibiotics per medication orders.  Antitussives per medication orders.  Avoid exposure to tobacco smoke and fumes.  B-agonist inhaler.  Call if shortness of breath worsens, blood in sputum, change in character of cough, development of fever or chills, inability to maintain nutrition and hydration. Avoid exposure to tobacco smoke and fumes.  Trial of antihistamines.  Trial of steroid nasal spray.     Subjective:   Roseline Tyler is a 22 y.o. female here for evaluation of a cough. Onset of symptoms was 7 days ago. Symptoms have been gradually worsening since that time. The cough is dry and productive and is aggravated by nothing. Associated symptoms include: change in voice, fever, postnasal drip, shortness of breath and wheezing. Patient does have a history of asthma. Patient does not have a history of environmental allergens. Patient has not traveled recently. Patient does not have a history of smoking. Patient has not had a previous chest x-ray. Patient has not had a PPD done.    The following portions of the patient's history were reviewed and updated as appropriate: allergies, current medications, past family history, past medical history, past social history, past surgical history and problem list.    Review of Systems  A 12 point comprehensive review of systems was negative except as noted.      Objective:   /64  Pulse 81  Temp 98.3  F (36.8  C) (Oral)   Wt (!) 245 lb 3.2 oz (111.2 kg)  LMP 05/27/2018  Breastfeeding? No  BMI 46.33 kg/m2  General appearance: alert, appears stated age and cooperative  Head: Normocephalic, without obvious abnormality, atraumatic, sinuses tender to percussion  Eyes: conjunctivae/corneas clear. PERRL, EOM's intact. Fundi benign.  Ears: abnormal TM right ear - diminished mobility and dull and abnormal TM left ear - diminished mobility and dull  Nose: yellow discharge, moderate congestion, turbinates  swollen, inflamed  Throat: lips, mucosa, and tongue normal; teeth and gums normal  Neck: no adenopathy, no carotid bruit, no JVD, supple, symmetrical, trachea midline and thyroid not enlarged, symmetric, no tenderness/mass/nodules  Lungs: clear to auscultation bilaterally  Heart: regular rate and rhythm, S1, S2 normal, no murmur, click, rub or gallop  Pulses: 2+ and symmetric  Skin: Skin color, texture, turgor normal. No rashes or lesions  Lymph nodes: Cervical, supraclavicular, and axillary nodes normal.  Neurologic: Grossly normal

## 2021-06-19 NOTE — PROGRESS NOTES
ASSESSMENT:  1. Possible urinary tract infection  Patient with symptoms suggestive of urinary tract infection.  - Urinalysis  - Culture, Urine        PLAN:  1.  Urinalysis reviewed, await results of urine culture.  2.  Empiric treatment with Bactrim double strength 1 tablet twice daily ×3 days.  3.  If the urine culture is negative however I would discontinue the Bactrim.       Orders Placed This Encounter   Procedures     Culture, Urine     Urinalysis     Medications Discontinued During This Encounter   Medication Reason     guaiFENesin (ROBITUSSIN) 100 mg/5 mL syrup      fluticasone (FLONASE ALLERGY RELIEF) 50 mcg/actuation nasal spray        No Follow-up on file.    CHIEF COMPLAINT:  Chief Complaint   Patient presents with     Urinary Tract Infection     x 2 weeks, hurts to urinate, frequent urine        SUBJECTIVE:  Roseline is a 22 y.o. female who comes in with about a week perhaps a bit longer of symptoms of some burning with urination and increased urinary frequency.  She does report bladder infections in the past so I do not see any urine cultures confirming this.  Patient has a new health medication but that is the only change in her health.  No fever chills she is not having any back or any abdominal pain.    REVIEW OF SYSTEMS:      All other systems are negative.    PFSH:  Immunization History   Administered Date(s) Administered     Hep A, historic 07/18/2007, 08/12/2008     Influenza, Seasonal, Inj PF IIV3 11/08/2007, 11/26/2008, 09/03/2009, 09/18/2013     Meningococcal MCV4 Conjugate,Unspecified 09/03/2009     Tdap 09/03/2009     Social History     Social History     Marital status: Single     Spouse name: N/A     Number of children: 0     Years of education: N/A     Occupational History      [employer]     Social History Main Topics     Smoking status: Never Smoker     Smokeless tobacco: Never Used     Alcohol use No     Drug use: No     Sexual activity: Yes      Partners: Male     Birth control/ protection: Pill     Other Topics Concern     Not on file     Social History Narrative     Past Medical History:   Diagnosis Date     Anxiety      Asthma      Depression      Family History   Problem Relation Age of Onset     Mental illness Mother      No Medical Problems Father      No Medical Problems Sister      Cancer Brother      Heart disease Maternal Grandmother      Cancer Maternal Grandfather 72     lung     Cancer Paternal Grandmother 64     Cancer Paternal Grandfather        MEDICATIONS:  Current Outpatient Prescriptions   Medication Sig Dispense Refill     albuterol (PROAIR HFA;PROVENTIL HFA;VENTOLIN HFA) 90 mcg/actuation inhaler Inhale 2 puffs as needed.       albuterol (PROAIR HFA;PROVENTIL HFA;VENTOLIN HFA) 90 mcg/actuation inhaler Inhale 2 puffs every 6 (six) hours as needed for wheezing. 1 each 2     FLUoxetine (PROZAC) 10 MG capsule Take 1 capsule (10 mg total) by mouth daily. With 40 mg tab for 50 mg total. 90 capsule 1     FLUoxetine (PROZAC) 40 MG capsule Take 1 capsule (40 mg total) by mouth daily. With the 10 mg tab for 50 mg daily 90 capsule 1     gabapentin (NEURONTIN) 300 MG capsule Take 300 capsules by mouth daily.       hydrOXYzine HCl (ATARAX) 25 MG tablet Take 25 mg by mouth daily.       norgestimate-ethinyl estradiol (TRI-ESTARYLLA) 0.18/0.215/0.25 mg-35 mcg (28) Tab tablet Take 1 tablet by mouth daily. 3 tablet 3     topiramate (TOPAMAX) 50 MG tablet Take 50 mg by mouth daily.       sulfamethoxazole-trimethoprim (BACTRIM DS) 800-160 mg per tablet Take 1 tablet by mouth 2 (two) times a day for 3 days. 6 tablet 0     No current facility-administered medications for this visit.        TOBACCO USE:  History   Smoking Status     Never Smoker   Smokeless Tobacco     Never Used       VITALS:  Vitals:    08/15/18 1106   BP: 110/60   Pulse: 71   SpO2: 98%   Weight: (!) 242 lb (109.8 kg)     Wt Readings from Last 3 Encounters:   08/15/18 (!) 242 lb (109.8 kg)    06/11/18 (!) 245 lb 3.2 oz (111.2 kg)   06/01/18 (!) 241 lb (109.3 kg)       PHYSICAL EXAM:  Constitutional:   Reveals a female who appears healthy.  Vitals: per nursing notes.  Musculoskeletal: No peripheral swelling.  Neuro:  Alert and oriented. Cranial nerves, motor, sensory exams are intact.  No gross focal deficits.  Psychiatric:  Memory intact, mood appropriate.    QUALITY MEASURES:      DATA REVIEWED:  Urinalysis reviewed.

## 2021-06-20 NOTE — PROGRESS NOTES
"Assessment:     1. Pain of finger of right hand  XR Finger Right 2 or More VWS       Plan:     Roseline is a 23-year-old presenting today with a finger injury.  The patient was reassured that her x-ray appears negative for any type of fracture, however, certainly there may be some ligamentous strain and I suspect what she has is more of a finger sprain.  I would like her to have a more comprehensive examination of that finger when she is able to more comfortably move it through range of motion.  I asked her to follow-up in 2 weeks for reassessment.  In the meantime she should wear the finger splint but in 1 week's time should be able to start some gentle range of motion exercises.  I recommended icing twice a day for the next few days as well.    Subjective:       23 y.o. female presents for evaluation of a finger injury.  The patient reports that on Friday she somehow moved her fifth digit and vented across her fourth and felt sudden onset of pain.  It is been quite tender since that time.  The patient reports that she saw bruising initially.  The patient went to the pharmacy and got a finger brace and has been using it since that time but was concerned that she could have really injured it.      Reviewed: The following portions of the patient's history were reviewed and updated as appropriate: allergies, current medications, past family history, past medical history, past social history, past surgical history and problem list.    Review of Systems  Pertinent items are noted in HPI.       Objective:     BP 90/58 (Patient Site: Left Arm, Patient Position: Sitting, Cuff Size: Adult Large)  Pulse 74  Ht 5' 1\" (1.549 m)  Wt (!) 244 lb (110.7 kg)  BMI 46.1 kg/m2  General appearance: alert, appears stated age and cooperative  Extremities: 5th digit right hand: no obvious bruising and minimal swelling. Painful to flex much at all. Tender along the distal 5th metatarsal, MTP joint and proximal half of the " finger.    Xray: no fracture seen.       This note has been dictated using voice recognition software. Any grammatical or context distortions are unintentional and inherent to the software

## 2021-06-20 NOTE — LETTER
Letter by Yolande Madrigal PA-C at      Author: Yolande Madrigal PA-C Service: -- Author Type: --    Filed:  Encounter Date: 6/30/2020 Status: (Other)       Roseline Tyler was seen and treated in our department on 6/30/2020.  She may return to work on 7/6/20 with no restrictions.    If you have any questions or concerns, please don't hesitate to call.      Yolande Madrigal PA-C  [facility]

## 2021-06-21 NOTE — LETTER
Letter by Yolande Madrigal PA-C at      Author: Yolande Madrigal PA-C Service: -- Author Type: --    Filed:  Encounter Date: 1/13/2021 Status: (Other)       Roseline Tyler  [address]  [place] [address]      01/19/21      Dear Roseline,      In reviewing your records, we have determined a medication check is needed before your next refill, please call the [facility] to schedule an appointment.      Medication check/review      We have made attempts to call you for an appointment, please verify your contact information is correct when calling back for an appointment, or if you have transferred your care to another clinic, please contact us so we can update our records.     Please call [phone] to schedule an appointment.    We believe that a strong preventative care program, including regular physicals and follow-up care is an important part of a healthy lifestyle and we are committed to helping you maintain your health.    Thank you for choosing us as your health care provider.    Sincerely,    Joy Bocanegra   [facility]  [address] [address]  [phone]

## 2021-06-21 NOTE — LETTER
Letter by Yolande Madrigal PA-C at      Author: Yolande Madrigal PA-C Service: -- Author Type: --    Filed:  Encounter Date: 1/13/2021 Status: (Other)       Roseline Tyler  [address]  [place] [address]      01/19/21      Dear Roseline,    We have received your medical message.  Effective September 10, 2020, Yolande Madrigal PA-C, will no longer see patients at [facility]. Our clinic staff is dedicated to helping you make a smooth transition to a new healthcare provider. The following providers at our clinic would be happy to partner with you for your  healthcare needs:    ? Khadar Bearden MD Family Medicine  ? Blake Hunt MD   ? Janes Setwart MD  ? Gema Rhodes NP Internal Medicine  ? Sarah Arias MD Internal Medicine/Pediatrics  ? Shabana Tucker NP Pediatrics  ? Dansiha Brown MD Pediatrics  ? Penny Jiménez MD Family Medicine    Please note: If you currently have a prescription for medication and have no refills remaining, you need to establish care with a new provider before getting your prescription refilled.      If you have any questions or want more information about providers at our clinic or other [facility], please visit [email or web address] or call [phone] (toll-free). Thank you for choosing [facility] for your medical care.    I wish you and your family the very best of [facility].    Sincerely,    Joy Bocanegra   [facility] [place]  [facility]  [facility] [address]  [phone]

## 2021-06-26 ENCOUNTER — HEALTH MAINTENANCE LETTER (OUTPATIENT)
Age: 26
End: 2021-06-26

## 2021-07-03 NOTE — ADDENDUM NOTE
Addendum Note by Obdulio Madrigal PA-C at 1/3/2020 12:55 PM     Author: Obdulio Madrigal PA-C Service: -- Author Type: Physician Assistant    Filed: 1/3/2020 12:55 PM Encounter Date: 1/3/2020 Status: Signed    : Obdulio Madrigal PA-C (Physician Assistant)    Addended by: OBDULIO MADRIGAL on: 1/3/2020 12:55 PM        Modules accepted: Orders

## 2021-07-03 NOTE — ADDENDUM NOTE
Addendum Note by Obdulio Madrigal PA-C at 6/30/2020  1:50 PM     Author: Obdulio Madrigal PA-C Service: -- Author Type: Physician Assistant    Filed: 6/30/2020  2:56 PM Encounter Date: 6/30/2020 Status: Signed    : Obdulio Madrigal PA-C (Physician Assistant)    Addended by: OBDULIO MADRIGAL on: 6/30/2020 02:56 PM        Modules accepted: Level of Service

## 2021-07-03 NOTE — ADDENDUM NOTE
Addendum Note by Obdulio Madrigal PA-C at 6/30/2020  1:50 PM     Author: Obdulio Madrigal PA-C Service: -- Author Type: Physician Assistant    Filed: 6/30/2020  4:09 PM Encounter Date: 6/30/2020 Status: Signed    : Obdulio Madrigal PA-C (Physician Assistant)    Addended by: OBDULIO MADRIGAL on: 6/30/2020 04:09 PM        Modules accepted: Orders

## 2021-10-16 ENCOUNTER — HEALTH MAINTENANCE LETTER (OUTPATIENT)
Age: 26
End: 2021-10-16

## 2022-07-23 ENCOUNTER — HEALTH MAINTENANCE LETTER (OUTPATIENT)
Age: 27
End: 2022-07-23

## 2022-10-01 ENCOUNTER — HEALTH MAINTENANCE LETTER (OUTPATIENT)
Age: 27
End: 2022-10-01

## 2023-08-06 ENCOUNTER — HEALTH MAINTENANCE LETTER (OUTPATIENT)
Age: 28
End: 2023-08-06